# Patient Record
Sex: FEMALE | Race: WHITE
[De-identification: names, ages, dates, MRNs, and addresses within clinical notes are randomized per-mention and may not be internally consistent; named-entity substitution may affect disease eponyms.]

---

## 2018-04-24 ENCOUNTER — HOSPITAL ENCOUNTER (OUTPATIENT)
Dept: HOSPITAL 17 - HDIC | Age: 78
LOS: 1 days | Discharge: HOME | End: 2018-04-25
Attending: SURGERY
Payer: COMMERCIAL

## 2018-04-24 VITALS
TEMPERATURE: 98.2 F | OXYGEN SATURATION: 96 % | RESPIRATION RATE: 20 BRPM | DIASTOLIC BLOOD PRESSURE: 66 MMHG | SYSTOLIC BLOOD PRESSURE: 131 MMHG | HEART RATE: 102 BPM

## 2018-04-24 VITALS
RESPIRATION RATE: 18 BRPM | DIASTOLIC BLOOD PRESSURE: 101 MMHG | TEMPERATURE: 98.2 F | SYSTOLIC BLOOD PRESSURE: 229 MMHG | HEART RATE: 75 BPM | OXYGEN SATURATION: 93 %

## 2018-04-24 VITALS
RESPIRATION RATE: 18 BRPM | DIASTOLIC BLOOD PRESSURE: 101 MMHG | SYSTOLIC BLOOD PRESSURE: 229 MMHG | TEMPERATURE: 98.5 F | HEART RATE: 75 BPM | OXYGEN SATURATION: 93 %

## 2018-04-24 VITALS
SYSTOLIC BLOOD PRESSURE: 208 MMHG | TEMPERATURE: 99 F | RESPIRATION RATE: 20 BRPM | HEART RATE: 87 BPM | DIASTOLIC BLOOD PRESSURE: 81 MMHG | OXYGEN SATURATION: 96 %

## 2018-04-24 VITALS — BODY MASS INDEX: 21.9 KG/M2 | HEIGHT: 66 IN | WEIGHT: 136.25 LBS

## 2018-04-24 VITALS — HEART RATE: 96 BPM

## 2018-04-24 VITALS — HEART RATE: 112 BPM

## 2018-04-24 VITALS — HEART RATE: 78 BPM

## 2018-04-24 VITALS — DIASTOLIC BLOOD PRESSURE: 97 MMHG | HEART RATE: 106 BPM | SYSTOLIC BLOOD PRESSURE: 212 MMHG

## 2018-04-24 VITALS — HEART RATE: 72 BPM

## 2018-04-24 VITALS — HEART RATE: 74 BPM

## 2018-04-24 VITALS — HEART RATE: 76 BPM

## 2018-04-24 DIAGNOSIS — Z79.01: ICD-10-CM

## 2018-04-24 DIAGNOSIS — I10: ICD-10-CM

## 2018-04-24 DIAGNOSIS — I70.212: Primary | ICD-10-CM

## 2018-04-24 LAB
INR PPP: 1.1 RATIO
PROTHROMBIN TIME: 10.7 SEC (ref 9.8–11.6)

## 2018-04-24 PROCEDURE — G0269 OCCLUSIVE DEVICE IN VEIN ART: HCPCS

## 2018-04-24 PROCEDURE — 75625 CONTRAST EXAM ABDOMINL AORTA: CPT

## 2018-04-24 PROCEDURE — C1887 CATHETER, GUIDING: HCPCS

## 2018-04-24 PROCEDURE — 37224: CPT

## 2018-04-24 PROCEDURE — C1760 CLOSURE DEV, VASC: HCPCS

## 2018-04-24 PROCEDURE — 85730 THROMBOPLASTIN TIME PARTIAL: CPT

## 2018-04-24 PROCEDURE — 85610 PROTHROMBIN TIME: CPT

## 2018-04-24 PROCEDURE — 37228: CPT

## 2018-04-24 PROCEDURE — C2623 CATH, TRANSLUMIN, DRUG-COAT: HCPCS

## 2018-04-24 PROCEDURE — C1769 GUIDE WIRE: HCPCS

## 2018-04-24 PROCEDURE — C1893 INTRO/SHEATH, FIXED,NON-PEEL: HCPCS

## 2018-04-24 PROCEDURE — C1751 CATH, INF, PER/CENT/MIDLINE: HCPCS

## 2018-04-24 PROCEDURE — 75710 ARTERY X-RAYS ARM/LEG: CPT

## 2018-04-24 PROCEDURE — C1725 CATH, TRANSLUMIN NON-LASER: HCPCS

## 2018-04-24 PROCEDURE — 99153 MOD SED SAME PHYS/QHP EA: CPT

## 2018-04-24 PROCEDURE — 99152 MOD SED SAME PHYS/QHP 5/>YRS: CPT

## 2018-04-24 NOTE — PD.VS.PN
Pre-operative Note


Pre-operative diagnosis:


PAD, L LE claudication


Planned procedure:


Aortogram w/ L LE angiogram


Endovascular intervention


Interval History:


Pt has been feeling well ; no changes that would preclude OR.


Labs:


pending


Blood:


none needed


Imaging:


CTA reviewed


Orders:


NPO


Post-operative destination:


DOCU


Operative site marked:  Yes


Consent:


Informed consent has been obtained from Armani Pham. I have explained the 

procedure in detail and discussed the risks, benefits, and potential 

complications. All questions have been answered.











Chris Tatum MD Apr 24, 2018 09:22

## 2018-04-24 NOTE — HHI.PR
cc:   Chris Tatum MD


__________________________________________________





Immediate Post Op Note


Procedure Date:


Apr 24, 2018


Pre Op Diagnosis:  


L LE claudication, PAD


Post Op Diagnosis:  


Same


Surgeon:


Chris Tatum


Assistant(s):


none


Procedure:


1. Aortogram w/ L LE angiogram


2. L SFA PTA (5mm) with DCB


3. L peroneal PTA (3mm)


4. R CFA Angioseal


Findings:


SFA stent occlusion


Additional Information:


PTA of stent with embolism to peroneal, then PTA of peroneal


Complications:


embolism as above


Specimen(s) removed:


none


Estimated blood loss:


10mL


Anesthesia:  MAC


Drains:  None


Fluids:  500mL


IVF


Patient to:  Other (DOCU)


Patient Condition:  Good


Implant/Devices:  SEE IMPLANT LOG (if applicable)


Date/Time of Procedure:  SEE SURGICAL CARE RECORD











Chris Tatum MD Apr 24, 2018 12:45

## 2018-04-24 NOTE — CATHPROC
[employer] HIS Report

Study Information

Study Number    Admission           Scheduled Start              Study Start

 

[id number]    Apr 24 2018 8:27AM      04/24/2018 Apr 24 2018 11:26AM

 

Universal Service

 

Cath Endovascular Study

 

Admit Source               Facility Department

 

Other                   [facility] - Cath Lab

 

Physician and Clinical Staff

Initial MD Tatum, Chris          Circulator     Lizbeth Archer,RN

 

                         Recorder      Eliseo Serrano,RT(R)

 

                         Scrub        Trevor Ruby,RT(R)

 

Procedures Performed

Procedure                     Location (Site)           Vessel Name

 

Abdominal Angiogram                Abd Aorta (A3)           Aorta

PTA                        Peroneal (left)           Popliteal

PTA                        SFA (left)              Femoral Art

Wire insertion                   Fem Art (right)           Femoral Art

Equipment

Time         Description          Size     Mfg Part Number  Used/Scraped

                                       [id number]

11:32   [employer]NAM[employer]     OMNI FLUSH 65CM CATHETER    FR 4              Used

                                       *[id number]

                 INTRODUCER SET,

11:31   [employer]                       FR 5     [id number] *[id number] Used

                 MICROPUNCTURE STIFF

                 BALLOON, ADVANCE 18 LP .018 4        [id number]

12:28   COOK/ROMEO                      4X4               Used

                 X4                      *[id number]

                                       CXI-4.0--

11:51   COOK/ROMEO       CATHETER, FR4 CXI SUPPORT   FR 4             Used

                                       P-NS-0 *[id number]

                 SHEATH, FR6 AMARILIS 1 FLEXOR          [id number]

11:51   COOK[employer]ROMEO                       FR 6              Used

                 55CM                     *[id number]

                 WIRE, GUIDE APPROACH            FBP--25G

11:58   COOK/ROMEO                       300CM            Used

                 MICROWIRE                   *[id number]

                 WIRE, STORQ STANDARD MOD J          [id number]

11:53   CORDIS/ ROMEO                     300CM              Used

                 300CM                    *[id number]

                                       [id number]

12:33   DAIG/[employer] ANGIOSEAL, FR6 VIP       FR 6              Used

                                       *[id number]

                                       [id number]

12:27   [employer]IG/[employer] ANGIOSEAL, FR6 VIP       FR 6              Used

                                       *[id number]

                 BALLOON, ADMIRAL IN.PACT 5 X         [id number]

12:00   [employer]                130CM               Used

                 80 130CM                   *[id number]

                                       [id number]

11:31   [employer]   PACK, CCL CUSTOM        *                Used

                                       *[id number]

                                       [id number]

11:51   [employer]     WIRE, MENDEZ 260CM .035     260CM              Used

                                       *[id number]

                 TUBING, PRESSURE INJECTION          [id number]

11:31   NAMIC PACER                      72"               Used

                 72"                     *[id number]

11:31   [employer]        OMNIPAQUE, 300 MG, 150ML    150ML     [id number]      Used

 

11:31   [employer]        OMNIPAQUE, 300 MG, 50ML    50ML     [id number]      Used

                                       [id number]

11:31   [employer]     BLANKET,WARM AIR CCL      *                Used

                                       *[id number]

                                       [id number]

11:32   [employer]     SHEATH, FR4 TERUMO (10CM)   FR 4              Used

                                       *[id number]

                                       [id number]

11:55   [employer]     SHEATH, FR6 TERUMO (10CM)   FR 6              Used

                                       *[id number]

                 CATHETER, FR4 GUIDE ANGLED

11:47   [employer]/ROMEO                  FR 4      *[id number] Used

                 120CM

                 WIRE, ANGLED GLIDE .035           [id number]

11:31   [employer]/ROMEO                  260CM              Used

                 260CM                    *[id number]

 

Equipment Model, Serial, Lot Number and Expiration Data

Description           Model Number      Serial Number  Lot Number       Expiration Date

 

ANGIOSEAL, FR6 VIP                           [id number]        10-

 

ANGIOSEAL, FR6 VIP                           [id number]        10-

BALLOON, ADMIRAL IN.PACT 5 X

                                    [id number]       12-

80 130CM

CATHETER, FR4 CXI SUPPORT                        [id number]         03-

SHEATH, FR6 AMARILIS 1 FLEXOR

                                    [id number]         01-

55CM

WIRE, GUIDE APPROACH 

                                    [id number]         06-

MICROWIRE

Medication

Medication Total Dose (Bolus/Oral)

Medication             Total Dosage/Unit

 

1% XYLOCAINE               10 mL

 

FENTANYL                 100 mcg

 

HEPARIN                5000 units

 

VERSED                   2 mg

 

Medications (Bolus/Oral)

Medication          Time Given          Dosage/Unit      Administered By       Reason

 

VERSED            4/24/2018 11:40:29 AM      1 mg        Lizbeth Archer

1 mg VERSED given in lab by Lizbeth Archer, RN via Peripheral IV. Ordered by Chris Tatum.

 

1% XYLOCAINE         4/24/2018 11:41:12 AM     10 mL        Chris Tatum

10 mL 1% XYLOCAINE given in lab by Chris Tatum in Right Groin via Subcutaneous. Ordered by Chris Tatum.

 

FENTANYL           4/24/2018 11:41:57 AM     50 mcg        Lizbeth Archer

50 mcg FENTANYL given in lab by Lizbeth Archer, RN via Peripheral IV. Ordered by Chris Tatum.

 

HEPARIN           4/24/2018 11:51:04 AM    5000 units       Lizbeth Archer

5000 units HEPARIN given in lab by Lizbeth Archer, RN via Peripheral IV. Ordered by Chris Tatum.

 

VERSED            4/24/2018 12:04:06 PM      1 mg        Lizbeth Archer

1 mg VERSED given in lab by Lizbeth Archer, RN via Peripheral IV. Ordered by Chris Tatum.

 

FENTANYL           4/24/2018 12:05:45 PM     50 mcg        Lizbeth Archer

50 mcg FENTANYL given in lab by Lizbeth Archer, RN via Peripheral IV. Ordered by Chris Tatum.

 

Medication (Drip)

Medication          Time Given          Dosage/Unit      Concentration/Unit  Diluent (ml)  Solution

SODIUM BICARBONATE

               4/24/2018 11:30:04 AM     15 mL/hr         150       1000      NaCl .9

DRIP

Patient arrived on 15 mL/hr SODIUM BICARBONATE DRIP given by Chris Tatum in Left Antecubital via P
eripheral IV. Pump/Drip Flow = 100 ml/hr

using NaCl .9 with a concentration of 150 in 1000 ml. Ordered by Chris Tatum.

Initial Case Assessment

Cardiovascular

HR          Rhythm         NIBP

 

88          sr           193/101

 

Edema Present     Skin color           Skin

 

None         Normal             Warm Dry

 

Circulatory - Right Pulses

Femoral

 

3

 

Scale (0,1,2,3,4,d)

 

Circulatory - Left Pulses

Femoral

 

2

 

Scale (0,1,2,3,4,d)

 

Neurological State

           Oriented to time-place-

Alert                     Moves all extremities

           person

 

Respiration - General

Respiration Rate

           SpO2 (%)        O2 (lpm)

(B/min)

18          98           0

Final Case Assessment

Cardiovascular

HR            Rhythm         NIBP

 

89            sr           153/90

 

Edema Present       Skin color           Skin

 

None           Normal             Warm Dry

 

Circulatory - Right Pulses

Femoral

 

3

 

Scale (0,1,2,3,4,d)

 

Circulatory - Left Pulses

Femoral

 

2

 

Scale (0,1,2,3,4,d)

 

Neurological State

             Oriented to time-place-

Alert                       Moves all extremities

             person

 

Respiration - General

Respiration Rate

             SpO2 (%)        O2 (lpm)

(B/min)

18            96           0

 

Chronological Log

Time    Study Chronological Log

 

11:23:08  Patient arrived via Bed. No labs drawn this visit per Dr. Tatum

 

11:26:14  Patient Name, D.O.B, / Armband Verified By R.N.

 

11:26:16  Consent signed by the physician and the patient and verified by the Cath Lab staff.

 

11:26:16  Pre-op and post- op instructions given; patient acknowledges understanding of instructions.


 

11:26:43  MD arrived.

      Vitals capture started with the following parameters, Patient=Adult, Interval=5 min, Initial Pr
scdamh=530 mmHg,

11:27:38

      Deflation Rate=5 mmHg, Cuff placed on Right Ankle

11:28:54  HR=90 bpm, SEJP=372/101 mmhg, SpO2=97.0 %, Resp=21 B/min, Ceja=2

 

11:29:32  Reference ECG taken

 

11:29:45  A # 20 IV was noted in the Antecubital (left). Grade = 0

      Patient arrived on 15 mL/hr SODIUM BICARBONATE DRIP given by Chris Tatum in Left Antecubital
 via Peripheral IV.

11:30:04

      Pump/Drip Flow = 100 ml/hr using NaCl .9 with a concentration of 150 in 1000 ml. Ordered by Chris Tejada.

11:30:29  History and physical on the chart or being dictated.

      Assessment: Initial Case, HR=88 BPM, Rhythm=sr, LNUR=020/101 mmhg, Edema=None, Color=Normal, Sk
in = Warm,

      Dry

      Right Pulses: Femoral=3

11:30:31

      Left Pulses: Femoral=2

      Neurological: State=Alert, Ox3, LANDEROS

      Respiration: Resp=18 B/min, SpO2=98 %, O2=0 lpm

11:31:01  Bilateral groins prepped with 2% chlorhexidine, and draped after a 3 minute waiting time.

 

11:33:22  EB=312 bpm, RRHU=056/103 mmhg, SpO2=97.0 %, Resp=21 B/min, Ceja=2

11:38:23  HR=89 bpm, RXSZ=121/96 mmhg, SpO2=96.0 %, Resp=13 B/min, Ceja=2

      Time Out. Correct patient, correct procedure, correct physician, power injector loaded with con
trast with surgical team

11:40:08  present. Time Out Concurred by MD and individual staff in procedure. Loaded by Joana Archer Rn
. verified by Trevor Ruby.

11:40:29  1 mg VERSED given in lab by Lizbeth Archer, RN via Peripheral IV. Ordered by Deandre Tatum

 

11:41:00  Case Start

 

11:41:02  Verbal Stimulation=2 Physical Stimulation=2 Airway=2 Respiration=2 TOTAL=8. (0=absent, 1=li
mited, 2=present)

 

11:41:12  10 mL 1% XYLOCAINE given in lab by Chris Tatum in Right Groin via Subcutaneous. Ordered 
by Chris Tatum.

 

11:41:57  50 mcg FENTANYL given in lab by Lizbeth Archer, RN via Peripheral IV. Ordered by KRISHNA Tatum.

 

11:42:11  Access site was Right Femoral Artery.

 

11:42:20  A SHEATH, FR4 TERUMO (10CM) FR 4 was advanced into the Fem Art (right) using the Percutaneo
us technique.

 

11:43:22  HR=94 bpm, OIZB=490/98 mmhg, SpO2=94.0 %, Resp=17 B/min, Ceja=2

      A OMNI FLUSH 65CM CATHETER FR 4 was advanced over a wire. OMNIPAQUE, 300 MG, 150ML 150ML was us
ed for

11:43:40

      injections.

11:44:30  Through a OMNI FLUSH 65CM CATHETER FR 4, The Abdominal Aorta was injected with 10 cc's of c
ontrast.

 

11:45:43  A WIRE, ANGLED GLIDE .035 260CM 260CM was inserted via Fem Art (right).

      After removing the current catheter a CATHETER, FR4 GUIDE ANGLED 120CM FR 4 was advanced over a
 WIRE,

11:47:27

      ANGLED GLIDE .035 260CM 260CM.

11:48:23  HR=89 bpm, YNTG=297/85 mmhg, SpO2=95.0 %, Resp=17 B/min, Ceja=2

 

11:49:40  Injections down left leg through 4fr angled catheter.

 

11:51:04  5000 units HEPARIN given in lab by Lizbeth Archer, RN via Peripheral IV. Ordered by Chris Tatum.

 

11:51:17  A WIRE, MENDEZ 260CM .035 260CM was inserted via Fem Art (right).

 

11:51:23  Catheter was removed

 

11:53:20  HR=94 bpm, CGNW=083/90 mmhg, SpO2=93.0 %, Resp=18 B/min, Ceja=2

      A SHEATH, FR6 TERUMO (10CM) FR 6 was exchanged in the Fem Art (right). This was necessary in or
ronnie to

11:55:19

      accomodate a larger catheter.

      A SHEATH, FR6 AMARILIS 1 FLEXOR 55CM FR 6 was exchanged in the Fem Art (right). This was necessary
 in order to

11:55:27

      accomodate a larger catheter.

11:58:21  HR=89 bpm, PNRV=970/87 mmhg, SpO2=95.0 %, Resp=20 B/min, Ceja=2

      A CATHETER, FR4 CXI SUPPORT FR 4 was advanced over a wire. OMNIPAQUE, 300 MG, 50ML 50ML was use
d for

11:58:43

      injections.

11:58:49  The previous wire was exchanged for a WIRE, GUIDE APPROACH  MICROWIRE 300CM.

 

12:00:26  The previous wire was exchanged for a WIRE, STORQ STANDARD MOD J 300CM 300CM.

 

12:03:20  HR=93 bpm, KYBY=367/98 mmhg, SpO2=94.0 %, Resp=20 B/min, Ceja=2

      A BALLOON, ADMIRAL IN.PACT 5 X 80 130CM 130CM was inserted over WIRE, STORQ STANDARD MOD J 300C
M

12:03:20

      300CM via the SFA (left).

12:03:37  In the SFA (left) a BALLOON, ADMIRAL IN.PACT 5 X 80 130CM 130CM was inflated to 8 atms for 
180 seconds.

 

12:04:06  1 mg VERSED given in lab by Lizbeth Archer, RN via Peripheral IV. Ordered by Deandre Tatum

 

12:05:45  50 mcg FENTANYL given in lab by Lizbeth Archer, RN via Peripheral IV. Ordered by KRISHNA Tatum.

 

12:08:21  HR=84 bpm, LDML=061/89 mmhg, SpO2=94.0 %, Resp=12 B/min, Ceja=2

 

12:10:38  Balloon Removed.

 

12:13:24  HR=83 bpm, TUJG=444/76 mmhg, SpO2=94.0 %, Resp=17 B/min, Ceja=2

 

12:13:44  The previous wire was exchanged for a WIRE, GUIDE APPROACH  MICROWIRE 300CM.

 

12:18:23  HR=83 bpm, ACRL=277/71 mmhg, SpO2=94.0 %, Resp=15 B/min, Ceja=2

 

12:19:16  Catheter was removed w/o difficulty

       A balloons was inserted over WIRE, GUIDE APPROACH  MICROWIRE 300CM via the Peroneal (left).
 COOK 3mm *

12:19:18

       4cm .018 Balloon.

12:20:11   In the Peroneal (left) a balloons was inflated to 12 atms for 120 seconds.

 

12:23:19   In the Peroneal (left) a balloons was inflated to 12 atms for 120 seconds.

 

12:23:20   HR=79 bpm, CUGC=763/81 mmhg, SpO2=92.0 %, Resp=17 B/min, Ceja=2

 

12:26:26   Balloon Removed.

       A BALLOON, ADVANCE 18 LP .018 4 X 4 4 X 4 was inserted over WIRE, GUIDE APPROACH  MICROWIRE
 300CM via

12:27:03

       the Peroneal (left).

12:27:49   In the Peroneal (left) a BALLOON, ADVANCE 18 LP .018 4 X 4 4 X 4 was inflated to 8 atms fo
r 20 seconds.

 

12:29:00   HR=80 bpm, TFTF=776/79 mmhg, SpO2=95.0 %, Resp=17 B/min, Ceja=2

 

12:31:11   Balloon Removed.

 

12:32:21   The previous wire was exchanged for a WIRE, STORQ STANDARD MOD J 300CM 300CM.

 

12:33:23   HR=90 bpm, NQPF=934/90 mmhg, SpO2=95.0 %, Resp=23 B/min, Ceja=2

 

12:34:10   ANGIOSEAL, FR6 VIP FR 6 placement in the Fem Art (right)

       Assessment: Final Case, HR=89 BPM, Rhythm=sr, LHJF=693/90 mmhg, Edema=None, Color=Normal, Skin
 = Warm,

       Dry

       Right Pulses: Femoral=3

12:35:01

       Left Pulses: Femoral=2

       Neurological: State=Alert, Ox3, LANDEROS

       Respiration: Resp=18 B/min, SpO2=96 %, O2=0 lpm

12:35:34   Catheter(s) removed without difficulty

 

12:35:37   Case End

 

12:35:38   No case complications noted.

 

12:35:40   Cine recording checked.

 

12:37:01   Patient moved to stretcher

 

12:38:22   HR=85 bpm, IWSF=628/80 mmhg, SpO2=95.0 %, Resp=19 B/min, Ceja=2

 

 

 

 

End Study - Contrast Media Used In Study

Contrast        Total Opened (mL)    Total Used (mL)       Total Wasted (mL)

 

Omnipaque       45           45              0

 

End Study - Radiation Exposure

Fluoro Time

(minutes)

13.8

 

 

End Study - Patient Disposition

Complications     Transferred To

 

No           Telemetry Bed

## 2018-04-25 VITALS
SYSTOLIC BLOOD PRESSURE: 154 MMHG | DIASTOLIC BLOOD PRESSURE: 68 MMHG | TEMPERATURE: 98.7 F | OXYGEN SATURATION: 96 % | RESPIRATION RATE: 16 BRPM | HEART RATE: 100 BPM

## 2018-04-25 VITALS
HEART RATE: 100 BPM | OXYGEN SATURATION: 96 % | SYSTOLIC BLOOD PRESSURE: 154 MMHG | RESPIRATION RATE: 16 BRPM | TEMPERATURE: 98.7 F | DIASTOLIC BLOOD PRESSURE: 68 MMHG

## 2018-04-25 VITALS — HEART RATE: 109 BPM

## 2018-04-25 VITALS — HEART RATE: 98 BPM

## 2018-04-25 VITALS — HEART RATE: 101 BPM | SYSTOLIC BLOOD PRESSURE: 182 MMHG | DIASTOLIC BLOOD PRESSURE: 80 MMHG

## 2018-04-25 VITALS — HEART RATE: 76 BPM

## 2018-04-25 VITALS — HEART RATE: 87 BPM

## 2018-04-25 VITALS — HEART RATE: 106 BPM

## 2018-04-25 VITALS — HEART RATE: 86 BPM

## 2018-04-25 VITALS — HEART RATE: 100 BPM

## 2018-04-25 NOTE — PD.VS.PN
Subjective


POD #:  1


Procedure(s):  


Aortogram w/ L LE angiogram


L SFA PTA (5mm) with DCB


L peroneal PTA (3mm)


R CFA Angioseal


Subjective/Hospital Course


78/F with a PMH of LEFT lower extremity claudication


Pt S/P Left lower extremity revascularization 


Pt w/o complaints


LE warm w/ motor intact





Objective


Vitals/I&O











  Date Time  Temp Pulse Resp B/P (MAP) Pulse Ox O2 Delivery O2 Flow Rate FiO2


 


4/25/18 08:00 98.7 100 16 154/68 (96) 96   


 


4/25/18 08:00  100      


 


4/25/18 07:00  100      


 


4/25/18 06:13  109      


 


4/25/18 05:00    182/80 (114)    


 


4/25/18 05:00  101      


 


4/25/18 04:00  98      


 


4/25/18 03:10  106      


 


4/25/18 03:09 98.7 100 16 154/68 (96) 96   


 


4/25/18 02:00  87      


 


4/25/18 01:00  86      


 


4/25/18 00:00  76      


 


4/24/18 23:00  100      


 


4/24/18 23:00 98.2 102 20 131/66 (87) 96   


 


4/24/18 22:00  96      


 


4/24/18 21:00  110  212/97 (135)    


 


4/24/18 21:00  106      


 


4/24/18 20:00  112      


 


4/24/18 19:00  87      


 


4/24/18 19:00 99.0 87 20 208/81 (123) 96   


 


4/24/18 18:00  76      


 


4/24/18 17:00  78      


 


4/24/18 16:00  74      


 


4/24/18 15:17 98.2 75 18 229/101 (143) 93   


 


4/24/18 15:00  72      


 


4/24/18 12:52     95 Room Air  


 


4/24/18 09:00 98.5 75 18 229/101 (143) 93   














 4/25/18 4/25/18 4/25/18





 07:00 15:00 23:00


 


Intake Total 380 ml  


 


Output Total 600 ml  


 


Balance -220 ml  








Exam:


GENERAL: A&OX3,NAD,GCS15


SKIN: 


LE warm with motor intact 


MUSCULOSKELETAL: No cyanosis, or edema. 








Laboratory





Laboratory Tests








Test


  4/24/18


13:40 4/24/18


20:56 4/25/18


05:27


 


Prothrombin Time 10.7   


 


Prothromb Time International


Ratio 1.1 


  


  


 


 


Activated Partial


Thromboplast Time 41.1 


  39.7 


  60.9 


 











Assessment and Plan


Assessment:  


(1) Claudication in peripheral vascular disease


(2) PAD (peripheral artery disease)


Plan


78/F s/p L LE revascularization 


Pt w/o complaints 


B LE warm w/ motor intact 





Plan


Pt clear for D/C w/ continued anticoagulation therapy (Lovenox) 


Arranged out pt f/u in 1W with a surveillance JEYSON 





Allegra Lizarraga NP


[facility]


[phone]


Discharge Planning


Today











Allegra Lizarraga [facility] Apr 25, 2018 09:01

## 2018-04-25 NOTE — MP
cc:

Chris Tatum MD

****

 

 

DATE OF OPERATION:

04/24/2018

 

PREOPERATIVE DIAGNOSIS:

Left lower extremity claudication, peripheral vascular disease.

 

POSTOPERATIVE DIAGNOSIS:

Left lower extremity claudication, peripheral vascular disease.

 

PROCEDURE PERFORMED:

1.  Aortogram with left lower extremity angiogram.

2.  Left SFA angioplasty.

3.  Left peroneal angioplasty.

4.  Right common femoral artery Angio-Seal.

 

ATTENDING SURGEON:

Chris Tatum MD.

 

ANESTHESIA:

Local with sedation.

 

INDICATIONS FOR PROCEDURE:

Ms. Pham is a 78-year-old lady with left lower extremity 

claudication that is lifestyle limiting.  She had previous 

endovascular therapies and the stents were noted to be occluded.  She 

is taken to the operating room for angiographic evaluation and 

treatment.  There is no prior catheterization based imaging available 

for my review.

 

DESCRIPTION OF PROCEDURE:

Informed consent was obtained from the patient.  She was taken to the 

operating room and placed supine on the operating table and an 

appropriate time-out was taken to ensure the patient's identity, 

operative site and planned procedure.  The administration of 

antibiotics was not necessary as this is a clean procedure without 

planned implantation of any foreign object.  Everyone in the room 

agreed with the time-out and we proceeded.  Her bilateral groins were 

prepped and draped and the right groin was anesthetized with 1% 

lidocaine.  A 21-gauge micropuncture needle was used to access the 

right limb of her aortobifemoral.  This was exchanged using Seldinger 

technique for the micropuncture sheath, through which a 0.035 

Glidewire was introduced.  The micropuncture sheath was exchanged for 

a 4-Thai sheath and a VCF catheter was placed over the wire and 

through the sheath.  An aortogram and pelvic arteriogram was obtained.

 The Glidewire was reintroduced and navigated down to the left common 

femoral artery and the VCF catheter exchanged for a Kumpe catheter and

this was advanced down the left common femoral artery, and a left 

lower extremity angiogram was obtained.  The patient was systemically 

heparinized with 5000 units of IV heparin.  A 0.035 STORQ wire was 

advanced down the mid SFA.  The Kumpe catheter and 4-Thai sheath 

removed and a 6-Thai 55 cm ____ was introduced.  A CXI catheter was 

placed over the STORQ and the STORQ exchanged for a  and the  

was used to navigate through the occluded stent.  The  and CXI wire

and catheter respectively were navigated through the occluded stent 

and the stent was angioplastied with a 5 mm drug-coated balloon for 3 

minutes.  Completion angiogram showed a persistent eccentric mass at 

the stent suggestive of thrombus and the distal angiogram showed the 

patient had a peroneal artery thrombus.  The  wire and CXI catheter

was then advanced down to the peroneal artery and an angioplasty of 

the peroneal artery was performed with a 3 and 4 mm balloon and the 

completion angiogram showed significant improvement in the embolic 

complication with runoff down to the distal peroneal artery.  The 

wire, catheter and sheath were removed.  The Infante was reintroduced 

through the sheath and the sheath was then removed with a Infante and 

the groin was closed with an Angio-Seal.  At the conclusion of the 

case, the patient had a warm foot with a nice Doppler signal in the 

foot and with motor intact.  She will be admitted postoperatively for 

a heparin drip.  She had no other complications.

 

INTERPRETATION OF IMAGES:

The patient's aortobifemoral is end-to-side proximally with a patent 

native right common iliac artery and an in-limb stent on the left.  

The common femoral artery and superficial femoral artery are patent.  

The profunda is patent.  There are 2  SFA stents on the 

right.  The second one is occluded and there was peroneal artery 

runoff to the foot.  After angioplasty of the distal SFA stent, there 

is a residual eccentric mass and there is a filling defect in the 

peroneal artery that was treated with an angioplasty.

 

 

 

 

 

 

__________________________________

MD BOZENA Herrera/GLEN/

D: 04/24/2018, 12:52 PM

T: 04/24/2018, 01:23 PM

Visit #: [id number]

Job #: [id number]

## 2018-04-25 NOTE — PD.VS.DC
__________________________________________________





Discharge Summary


Admission Date:


04/24/18


Discharge Date:  Apr 25, 2018


Admission Diagnosis:  


(1) PAD (peripheral artery disease)


(2) Claudication in peripheral vascular disease


Discharge Diagnosis:  


(1) Claudication in peripheral vascular disease


ICD Codes:  I73.9 - Peripheral vascular disease, unspecified


(2) PAD (peripheral artery disease)


ICD Codes:  I73.9 - Peripheral vascular disease, unspecified


Brief History from admission


78/F with a hx of L LE PAD with claudication


Procedure(s):  


Aortogram w/ L LE angiogram


L SFA PTA (5mm) with DCB


L peroneal PTA (3mm)


R CFA Angioseal


Significant Findings


GENERAL: A&OX3,NAD,GCS15


SKIN: 


LE warm with motor intact 


MUSCULOSKELETAL: No cyanosis, or edema. 








Laboratory Tests








Test


  4/24/18


13:40 4/24/18


20:56 4/25/18


05:27


 


Activated Partial


Thromboplast Time 41.1 SEC


(24.3-30.1) 39.7 SEC


(24.3-30.1) 60.9 SEC


(24.3-30.1)








Hospital Course:


78/F with a hx of L LE PAD with claudication 


Pt s/p Aortogram w/ L LE angiogram/L SFA PTA (5mm) with DCB/L peroneal PTA (3mm)

/R CFA Angioseal


Post procedure pt c/o left leg feeling cold/pt was motor intact and was 

admitted for observation and anticoagulation therapy





POD 1


Pt S/P Left lower extremity revascularization 


Pt w/o complaints


LE warm w/ motor intact


Pt clear for d/c with continued anticoagulation therapy (Lovenox) 


Arranged out pt f/u in 1W with a surveillance JEYSON








Allergies








Coded Allergies Type Severity Reaction Last Updated Verified


 


  No Known Allergies    10/1/12 Yes














 4/23/18 4/23/18 4/24/18 4/24/18 4/25/18 4/25/18





 06:00 18:00 06:00 18:00 06:00 18:00


 


Intake Total    425 ml 380 ml 


 


Output Total    400 ml 600 ml 


 


Balance    25 ml -220 ml 


 


      


 


Intake Oral    425 ml 240 ml 


 


IV Total     140 ml 


 


Output Urine Total    400 ml 600 ml 


 


# Bowel Movements    0  








Laboratory Tests








Test


  4/24/18


13:40 4/24/18


20:56 4/25/18


05:27


 


Prothrombin Time 10.7 SEC   


 


Prothromb Time International


Ratio 1.1 RATIO 


  


  


 


 


Activated Partial


Thromboplast Time 41.1 SEC 


  39.7 SEC 


  60.9 SEC 


 








Orders








Procedure Category Date Status





  Time 


 


Sodium Bicarbonate MED 4/24/18 In Process





8.4% Inj (Sodium Bica  09:30 


 


Hydralazine Inj MED 4/24/18 Complete





 (Apresoline Inj)  10:00 


 


Endovascular Cath CATH 4/24/18 Logged





   


 


Heparin-Ns/Pf Flush MED 4/24/18 Complete





Bag (Heparin-Ns/Pf F  11:30 


 


Midazolam Inj (Versed MED 4/24/18 Complete





Inj)  11:30 


 


Fentanyl Inj MED 4/24/18 Complete





 (Fentanyl Inj)  11:30 


 


Lidocaine Pf 1% Inj MED 4/24/18 Complete





 (Xylocaine-Mpf 1% In  11:30 


 


Heparin Inj (Heparin MED 4/24/18 Complete





Inj)  11:30 


 


Nitroglycerin Inj MED 4/24/18 Complete





 (Nitroglycerin Inj)  11:30 


 


Place In Observation ADMITTING 4/24/18 Transmitted





   


 


Code Status CODE 4/24/18 Transmitted





  12:45 


 


Vital Signs (Adult) BRONWYN 4/24/18 Complete





  12:45 


 


Cardiac Monitor / BRONWYN 4/24/18 In Process





Telemetry  12:45 


 


Activity Oob Ad Nimo BRONWYN 4/24/18 In Process





  18:00 


 


Activity Bed Rest BRONWYN 4/24/18 In Process





  12:45 


 


Precautions BRONWYN 4/24/18 In Process





  12:45 


 


Diet Heart Healthy DIET 4/24/18 Transmitted





  Lunch 


 


Clopidogrel (Plavix) MED 4/26/18 In Process





  09:00 


 


Famotidine (Pepcid) MED 4/24/18 In Process





  21:00 


 


Docusate Sodium-Senna MED 4/24/18 In Process





 (Nicole-Colace)  21:00 


 


Magnesium Hydroxide MED 4/24/18 In Process





Liq (Milk Of Magnesi  12:45 


 


Sennosides (Senokot) MED 4/24/18 In Process





  12:45 


 


Bisacodyl Supp MED 4/24/18 In Process





 (Dulcolax Supp)  12:45 


 


Lactulose Liq MED 4/24/18 In Process





 (Lactulose Liq)  12:45 


 


Heparin Inj (Heparin MED 4/24/18 In Process





Inj)  18:45 


 


Heparin Inj (Heparin MED 4/24/18 In Process





Inj)  18:45 


 


Heparin-D5w 25,000 MED 4/24/18 In Process





U/250 Ml (Heparin-D5w  12:45 


 


Act Partial Throm LAB 4/24/18 Complete





Time (Ptt)  12:45 


 


Prothrombin Time / LAB 4/24/18 Complete





Inr (Pt)  12:45 


 


Cbc No Diff, Includes LAB 4/27/18 Verified





Plts  06:00 


 


Act Partial Throm LAB 4/24/18 Complete





Time (Ptt)  19:45 


 


Occult Blood EDIS 4/24/18 In Process





 (Hemoccult) Stool  12:45 


 


^ Other Nursing Orders BRONWYN 4/24/18 In Process





  13:26 


 


Iohexol 350 Inj (Cath MED 4/24/18 Complete





Lab) (Omnipaque 35  08:28 


 


Hydralazine Inj MED 4/24/18 In Process





 (Apresoline Inj)  20:30 


 


Ondansetron Inj MED 4/24/18 In Process





 (Zofran Inj)  21:45 


 


Act Partial Throm LAB 4/25/18 Complete





Time (Ptt)  05:30 


 


Act Partial Throm LAB 4/25/18 In Process





Time (Ptt)  12:00 


 


Attending Discharge DISCHARGE 4/25/18 Transmitted





Order   








Vital Signs








  Date Time  Temp Pulse Resp B/P (MAP) Pulse Ox O2 Delivery O2 Flow Rate FiO2


 


4/25/18 08:00 98.7 100 16 154/68 (96) 96   


 


4/25/18 08:00  100      


 


4/25/18 07:00  100      


 


4/25/18 06:13  109      


 


4/25/18 05:00    182/80 (114)    


 


4/25/18 05:00  101      


 


4/25/18 04:00  98      


 


4/25/18 03:10  106      


 


4/25/18 03:09 98.7 100 16 154/68 (96) 96   


 


4/25/18 02:00  87      


 


4/25/18 01:00  86      


 


4/25/18 00:00  76      


 


4/24/18 23:00  100      


 


4/24/18 23:00 98.2 102 20 131/66 (87) 96   


 


4/24/18 22:00  96      


 


4/24/18 21:00  110  212/97 (135)    


 


4/24/18 21:00  106      


 


4/24/18 20:00  112      


 


4/24/18 19:00  87      


 


4/24/18 19:00 99.0 87 20 208/81 (123) 96   


 


4/24/18 18:00  76      


 


4/24/18 17:00  78      


 


4/24/18 16:00  74      


 


4/24/18 15:17 98.2 75 18 229/101 (143) 93   


 


4/24/18 15:00  72      


 


4/24/18 12:52     95 Room Air  


 


4/24/18 09:00 98.5 75 18 229/101 (143) 93   








Discharge Condition:  Good


Discharge Disposition:  Discharge Home


Discharge Instructions:


DIET


You may resume your regular diet


ACTIVITY


Activity as tolerated


You may shower


NO tub baths for 5 days


No swimming for 5 days 


MEDICATION


You may resume your home medication


You were prescribed an additional medication for anticoagulation therapy


This medication is given as an injection in the subcutaneous tissue (twice 

daily for 2W)


It is recommended to rotate injection sites





PLEASE call the office with any questions or concerns


Any questions or concerns: Call [facility] at [facility]  [phone]











Allegra Lizarraga Apr 25, 2018 09:22

## 2018-05-10 ENCOUNTER — HOSPITAL ENCOUNTER (OUTPATIENT)
Dept: HOSPITAL 17 - CPRE | Age: 78
End: 2018-05-10
Attending: SURGERY
Payer: COMMERCIAL

## 2018-05-10 DIAGNOSIS — I73.9: ICD-10-CM

## 2018-05-10 DIAGNOSIS — Z79.01: ICD-10-CM

## 2018-05-10 DIAGNOSIS — Z01.812: ICD-10-CM

## 2018-05-10 DIAGNOSIS — Z01.818: ICD-10-CM

## 2018-05-10 DIAGNOSIS — Z01.810: Primary | ICD-10-CM

## 2018-05-10 LAB
BASOPHILS # BLD AUTO: 0.1 TH/MM3 (ref 0–0.2)
BASOPHILS NFR BLD: 1.1 % (ref 0–2)
BUN SERPL-MCNC: 8 MG/DL (ref 7–18)
CALCIUM SERPL-MCNC: 8.7 MG/DL (ref 8.5–10.1)
CHLORIDE SERPL-SCNC: 104 MEQ/L (ref 98–107)
CREAT SERPL-MCNC: 0.86 MG/DL (ref 0.5–1)
EOSINOPHIL # BLD: 0.1 TH/MM3 (ref 0–0.4)
EOSINOPHIL NFR BLD: 2.3 % (ref 0–4)
ERYTHROCYTE [DISTWIDTH] IN BLOOD BY AUTOMATED COUNT: 14.2 % (ref 11.6–17.2)
GFR SERPLBLD BASED ON 1.73 SQ M-ARVRAT: 64 ML/MIN (ref 89–?)
HCO3 BLD-SCNC: 29.3 MEQ/L (ref 21–32)
HCT VFR BLD CALC: 42.3 % (ref 35–46)
HGB BLD-MCNC: 14.3 GM/DL (ref 11.6–15.3)
INR PPP: 1 RATIO
LYMPHOCYTES # BLD AUTO: 1.4 TH/MM3 (ref 1–4.8)
LYMPHOCYTES NFR BLD AUTO: 24.4 % (ref 9–44)
MCH RBC QN AUTO: 30.9 PG (ref 27–34)
MCHC RBC AUTO-ENTMCNC: 33.9 % (ref 32–36)
MCV RBC AUTO: 91.3 FL (ref 80–100)
MONOCYTE #: 0.3 TH/MM3 (ref 0–0.9)
MONOCYTES NFR BLD: 4.6 % (ref 0–8)
NEUTROPHILS # BLD AUTO: 4 TH/MM3 (ref 1.8–7.7)
NEUTROPHILS NFR BLD AUTO: 67.6 % (ref 16–70)
PLATELET # BLD: 211 TH/MM3 (ref 150–450)
PMV BLD AUTO: 7.7 FL (ref 7–11)
PROTHROMBIN TIME: 10.1 SEC (ref 9.8–11.6)
RBC # BLD AUTO: 4.63 MIL/MM3 (ref 4–5.3)
SODIUM SERPL-SCNC: 141 MEQ/L (ref 136–145)
WBC # BLD AUTO: 5.9 TH/MM3 (ref 4–11)

## 2018-05-10 PROCEDURE — 85730 THROMBOPLASTIN TIME PARTIAL: CPT

## 2018-05-10 PROCEDURE — 85025 COMPLETE CBC W/AUTO DIFF WBC: CPT

## 2018-05-10 PROCEDURE — 93005 ELECTROCARDIOGRAM TRACING: CPT

## 2018-05-10 PROCEDURE — 80048 BASIC METABOLIC PNL TOTAL CA: CPT

## 2018-05-10 PROCEDURE — 71046 X-RAY EXAM CHEST 2 VIEWS: CPT

## 2018-05-10 PROCEDURE — 36415 COLL VENOUS BLD VENIPUNCTURE: CPT

## 2018-05-10 PROCEDURE — 85610 PROTHROMBIN TIME: CPT

## 2018-05-10 NOTE — EKG
Date Performed: 05/10/2018       Time Performed: 11:28:24

 

PTAGE:      78 years

 

EKG:      Sinus rhythm 

 

 Since previous tracing, no significant change noted NORMAL ECG

 

PREVIOUS TRACING       :   02/06/2014   08.18.34

 

DOCTOR:   Nate Amaral  Interpretating Date/Time  05/10/2018 20:26:26

## 2018-05-10 NOTE — RADRPT
EXAM DATE/TIME:  05/10/2018 12:05 

 

[facility] COMPARISON:     

No previous studies available for comparison.

 

                     

INDICATIONS :     

Evaluate for pneumothorax, pneumonia or communicable disease. Preop chest for lower extremity vascula
r surgery on 5/14/18, no chest complaints at this time

                     

 

MEDICAL HISTORY :     

None.          

 

SURGICAL HISTORY :     

None.   

 

ENCOUNTER:     

Initial                                        

 

ACUITY:     

1 day      

 

PAIN SCORE:     

0/10

 

LOCATION:     

Bilateral chest 

 

FINDINGS:     

PA and lateral views of the chest demonstrate the lungs to be symmetrically aerated without evidence 
of mass, infiltrate or effusion.  The cardiomediastinal contours are unremarkable.  Osseous structure
s are intact. Clips are seen in the right upper quadrant of the abdomen.

 

CONCLUSION:     No acute disease.  

 

 

 

 Ron Cohn MD on May 10, 2018 at 12:47           

Board Certified Radiologist.

 This report was verified electronically.

## 2018-05-14 ENCOUNTER — HOSPITAL ENCOUNTER (INPATIENT)
Dept: HOSPITAL 17 - HSDI | Age: 78
LOS: 3 days | Discharge: HOME HEALTH | DRG: 254 | End: 2018-05-17
Attending: SURGERY | Admitting: SURGERY
Payer: COMMERCIAL

## 2018-05-14 VITALS — WEIGHT: 139.99 LBS | HEIGHT: 66 IN | BODY MASS INDEX: 22.5 KG/M2

## 2018-05-14 DIAGNOSIS — I70.222: Primary | ICD-10-CM

## 2018-05-14 DIAGNOSIS — I10: ICD-10-CM

## 2018-05-14 DIAGNOSIS — K21.9: ICD-10-CM

## 2018-05-14 DIAGNOSIS — Z87.891: ICD-10-CM

## 2018-05-14 DIAGNOSIS — I25.10: ICD-10-CM

## 2018-05-14 DIAGNOSIS — Z86.79: ICD-10-CM

## 2018-05-14 DIAGNOSIS — E11.51: ICD-10-CM

## 2018-05-14 LAB
ERYTHROCYTE [DISTWIDTH] IN BLOOD BY AUTOMATED COUNT: 14 % (ref 11.6–17.2)
HCT VFR BLD CALC: 36.7 % (ref 35–46)
HGB BLD-MCNC: 12.6 GM/DL (ref 11.6–15.3)
INR PPP: 1.1 RATIO
MCH RBC QN AUTO: 31.3 PG (ref 27–34)
MCHC RBC AUTO-ENTMCNC: 34.3 % (ref 32–36)
MCV RBC AUTO: 91.4 FL (ref 80–100)
PLATELET # BLD: 179 TH/MM3 (ref 150–450)
PMV BLD AUTO: 7.4 FL (ref 7–11)
PROTHROMBIN TIME: 10.7 SEC (ref 9.8–11.6)
RBC # BLD AUTO: 4.01 MIL/MM3 (ref 4–5.3)
WBC # BLD AUTO: 8.1 TH/MM3 (ref 4–11)

## 2018-05-14 PROCEDURE — 85730 THROMBOPLASTIN TIME PARTIAL: CPT

## 2018-05-14 PROCEDURE — 04UL0JZ SUPPLEMENT LEFT FEMORAL ARTERY WITH SYNTHETIC SUBSTITUTE, OPEN APPROACH: ICD-10-PCS | Performed by: SURGERY

## 2018-05-14 PROCEDURE — 04CL0ZZ EXTIRPATION OF MATTER FROM LEFT FEMORAL ARTERY, OPEN APPROACH: ICD-10-PCS | Performed by: SURGERY

## 2018-05-14 PROCEDURE — 041L0JN BYPASS LEFT FEMORAL ARTERY TO POSTERIOR TIBIAL ARTERY WITH SYNTHETIC SUBSTITUTE, OPEN APPROACH: ICD-10-PCS | Performed by: SURGERY

## 2018-05-14 PROCEDURE — 87641 MR-STAPH DNA AMP PROBE: CPT

## 2018-05-14 PROCEDURE — B41G1ZZ FLUOROSCOPY OF LEFT LOWER EXTREMITY ARTERIES USING LOW OSMOLAR CONTRAST: ICD-10-PCS | Performed by: SURGERY

## 2018-05-14 PROCEDURE — 75710 ARTERY X-RAYS ARM/LEG: CPT

## 2018-05-14 PROCEDURE — 85027 COMPLETE CBC AUTOMATED: CPT

## 2018-05-14 PROCEDURE — 80048 BASIC METABOLIC PNL TOTAL CA: CPT

## 2018-05-14 PROCEDURE — 86850 RBC ANTIBODY SCREEN: CPT

## 2018-05-14 PROCEDURE — 86900 BLOOD TYPING SEROLOGIC ABO: CPT

## 2018-05-14 PROCEDURE — 86901 BLOOD TYPING SEROLOGIC RH(D): CPT

## 2018-05-14 PROCEDURE — 85610 PROTHROMBIN TIME: CPT

## 2018-05-14 RX ADMIN — GABAPENTIN SCH MG: 100 CAPSULE ORAL at 22:32

## 2018-05-14 RX ADMIN — STANDARDIZED SENNA CONCENTRATE AND DOCUSATE SODIUM SCH TAB: 8.6; 5 TABLET, FILM COATED ORAL at 22:32

## 2018-05-14 RX ADMIN — ATORVASTATIN CALCIUM SCH MG: 40 TABLET, FILM COATED ORAL at 22:31

## 2018-05-14 RX ADMIN — FAMOTIDINE SCH MG: 20 TABLET, FILM COATED ORAL at 22:32

## 2018-05-14 NOTE — HHI.HP
History of Present Illness


Chief Complaint:


L LE rest pain


History of Present Illness


77 yo female with PAD and L LE rest pain, ABIs 0.4.  No tissue loss and no 

motor dysfunction.





Past/Family/Social History


Past Medical History


PAD


HTN


CAD


DM


GERD


Past Surgical History


ABF


R LE bypass


Multiple L LE interventions (endo)


Social History


former smoker


Family History


NC


Home Medications


Reported Medications


Diphenhydramine-Acetaminophen (Acetaminophen-Diphenhydramine) 500 Mg-25 Mg Tab, 

2 TAB PO HS


   5/10/18


Gabapentin (Gabapentin) 100 Mg Cap, 200 MG PO HS, #30 CAP 0 Refills


   5/10/18


Clopidogrel (Plavix) 75 Mg Tab, 75 MG PO DAILY for Blood Clot Prevention, #30 

TAB 0 Refills


   18


Discontinued Scripts


Enoxaparin Inj (Lovenox Inj) 60 Mg/0.6 Ml Syr, 60 MG SQ BID for PAD for 14 Days

, #28 SYRINGE 0 Refills


   Prov:ZiaAllegra F ARNP         18


Coded Allergies:  


     ACE Inhibitors (Verified  Adverse Reaction, Severe, Cough, 5/10/18)





Review of Systems


Constitutional:  DENIES: Diaphoretic episodes, Fatigue, Fever, Weight gain, 

Weight loss, Chills, Dizziness, Change in appetite, Night Sweats


Cardiovascular:  DENIES: Chest pain, Palpitations, Syncope, Dyspnea on Exertion

, PND, Lower Extremity Edema, Orthopnea, Claudication





Physical Exam


Neuro:


alert, oriented, no distress


HEENT:


NC/AT


Neck:


no JVD


Heart:


reg rate, no M


Lungs:


clear bilaterally


Abdomen:


soft, NT


Vascular:


palpable L femoral pulse, normal


no popliteal or pedal pulses


Extremities:


no wounds


L groin incision healed


pending


will do angiogram in OR as part of distal bypass





Caprini VTE Risk Assessment


Caprini VTE Risk Assessment:  No/Low Risk (score <= 1)


Caprini Risk Assessment Model











 Point Value = 1          Point Value = 2  Point Value = 3  Point Value = 5


 


Age 41-60


Minor surgery


BMI > 25 kg/m2


Swollen legs


Varicose veins


Pregnancy or postpartum


History of unexplained or recurrent


   spontaneous 


Oral contraceptives or hormone


   replacement


Sepsis (< 1 month)


Serious lung disease, including


   pneumonia (< 1 month)


Abnormal pulmonary function


Acute myocardial infarction


Congestive heart failure (< 1 month)


History of inflammatory bowel disease


Medical patient at bed rest Age 61-74


Arthroscopic surgery


Major open surgery (> 45 min)


Laparoscopic surgery (> 45 min)


Malignancy


Confined to bed (> 72 hours)


Immobilizing plaster cast


Central venous access Age >= 75


History of VTE


Family history of VTE


Factor V Leiden


Prothrombin 02311U


Lupus anticoagulant


Anticardiolipin antibodies


Elevated serum homocysteine


Heparin-induced thrombocytopenia


Other congenital or acquired


   thrombophilia Stroke (< 1 month)


Elective arthroplasty


Hip, pelvis, or leg fracture


Acute spinal cord injury (< 1 month)








Prophylaxis Regimen











   Total Risk


Factor Score Risk Level Prophylaxis Regimen


 


0-1      Low Early ambulation


 


2 Moderate Order ONE of the following:


*Sequential Compression Device (SCD)


*Heparin 5000 units SQ BID


 


3-4 Higher Order ONE of the following medications:


*Heparin 5000 units SQ TID


*Enoxaparin/Lovenox 40 mg SQ daily (WT < 150 kg, CrCl > 30 mL/min)


*Enoxaparin/Lovenox 30 mg SQ daily (WT < 150 kg, CrCl > 10-29 mL/min)


*Enoxaparin/Lovenox 30 mg SQ BID (WT < 150 kg, CrCl > 30 mL/min)


AND/OR


*Sequential Compression Device (SCD)


 


5 or more Highest Order ONE of the following medications:


*Heparin 5000 units SQ TID (Preferred with Epidurals)


*Enoxaparin/Lovenox 40 mg SQ daily (WT < 150 kg, CrCl > 30 mL/min)


*Enoxaparin/Lovenox 30 mg SQ daily (WT < 150 kg, CrCl > 10-29 mL/min)


*Enoxaparin/Lovenox 30 mg SQ BID (WT < 150 kg, CrCl > 30 mL/min)


AND


*Sequential Compression Device (SCD)











Assessment and Plan


Plan


L LE revascularization today





post-op admit


Discharge Planning


likely 3-4 days











Chris Tatum MD May 14, 2018 08:58

## 2018-05-14 NOTE — HHI.PR
cc:   Chris Tatum MD


__________________________________________________





Immediate Post Op Note


Procedure Date:


May 14, 2018


Pre Op Diagnosis:  


PAD, failed endovascular interventions


Post Op Diagnosis:  


PAD, failed endovascular interventions


Surgeon:


Chris Tatum


Assistant(s):


Chris Wilson


Procedure:


L PFA patch endarterectomy


L fem-PT with cryo


L LE angiogram


Findings:


very small tibial arteries


proximal profunda stenosis


Additional Information:


pt wendy well - strong PT at end of case


Complications:


none


Specimen(s) removed:


none for pathology


Estimated blood loss:


200mL


Anesthesia:  General


Drains:  None


Fluids:  1800mL


IVF


Urinary Output (mLs):  700


Patient to:  PACU


Patient Condition:  Good


Implant/Devices:  SEE IMPLANT LOG (if applicable)


Date/Time of Procedure:  SEE SURGICAL CARE RECORD











Chris Tatmu MD May 14, 2018 15:23

## 2018-05-14 NOTE — MP
cc:

Chris Tatum MD

****

 

 

DATE OF OPERATION:

05/14/2018

 

PREOPERATIVE DIAGNOSIS:

Left lower extremity rest pain.  Failed endovascular intervention.

 

POSTOPERATIVE DIAGNOSIS:

Left lower extremity rest pain, failed endovascular intervention.

 

PROCEDURE PERFORMED:

1.  Left lower extremity angiogram.

2.  Left profunda endarterectomy and patch angioplasty.

3.  Left femoral to posterior tibial artery bypass with cryopreserved 

vein.

 

OPERATING SURGEON:

Chris Tatum MD

 

ANESTHESIA:

General.

 

INDICATIONS FOR PROCEDURE:

Ms. Pham is a 78-year-old lady who has a history of peripheral 

arterial occlusive disease and a right lower extremity bypass as well 

as an aortobifemoral bypass.  She has had multiple left lower 

extremity interventions and these have failed.  Now, she has rest 

pain.  There is no prior catheter-based imaging available for my 

review since the worsening of her rest pain.  She was taken to the 

operating room for angiogram and a distal bypass revascularization.

 

DESCRIPTION OF PROCEDURE:

Informed consent was obtained from the patient.  She was taken to the 

operating room and placed supine on the operating table.  An 

appropriate timeout was taken to ensure the patient's identity, the 

operative site and planned procedure.  The administration of 2 grams 

of Ancef was initiated prior to skin incision and will be discontinued

after a single preoperative dose.  Everyone in the room agreed with 

the timeout and we proceeded.  She was prepped from her nipples to his

toes, a vertical incision made in the patient's left groin and carried

down through the subcutaneous tissue with electrocautery.  The ABF 

limb was identified and encircled and dissected free.  The profunda 

and superficial femoral artery were similarly dissected free.  The 

profunda was noted to have a high grade calcific stenosis proximally. 

A 21-gauge micropuncture needle was used to access the graft.  This 

was exchanged using Seldinger's technique for a micropuncture sheath 

through which a left lower extremity angiogram was obtained.  The 

angiogram showed the patient had profunda-based collaterals which 

reconstituted the posterior tibial and the peroneal.  The posterior 

tibia was in continuity with the foot.  This was then selected as the 

bypass target.  The microcatheter was removed and the graftotomy was 

closed with 5-0 Prolene suture.

 

A separate incision was made on the medial aspect of the calf, carried

down through subcutaneous tissue with electrocautery.  The posterior 

tibial artery was dissected free for several centimeters.  A tunnel 

was then created between these two.  The patient was systemically 

heparinized and throughout the remainder of the case the ACT was kept 

greater than 250.  Proximal control of the ABF limb and distal control

of the profunda were obtained with profunda clamps and a longitudinal 

arteriotomy was made with an 11 blade, extended with [place] 

scissors.  The artery was endarterectomized and patch was applied and 

sewn on using running 5-0 Prolene suture.  At the completion it was 

flushed and noted hemostatic.  A longitudinal patchotomy was made with

an 11 blade, extended with [place]uneet scissors.

 

The cryopreserved vein was brought up onto the field; it had been 

prepared in the standard fashion and it was flushed.  It was sewn in a

reversed fashion.  It was cut and bevelled and sewn end-to-side to the

patch with running 5-0 Prolene suture.  The clamps were then released;

the vein was distended, marked for orientation and passed through the 

tunnel, taking caution not to twist it.  Proximal and distal control 

of the posterior tibial artery was obtained with profunda clamps and a

longitudinal arteriotomy was made with 11 blade, extended with 

Puneet scissors.  A generous patch was then made for a distal 

anastomosis with a running 6-0 Prolene suture.  At the completion, it 

was flushed and noted to be hemostatic.  The clamps were released.  

There was a nice Doppler signal in the foot.  The heparin was 

reversed.  The wounds were irrigated and made hemostatic, and closed 

with 2-0 Polysorb, 3-0 Polysorb and 4-0 Monocryl.  The sponge and 

needle counts were correct at the end of the case.  I was present, 

scrubbed, and performed the entire procedure.

 

 

__________________________________

MD BOZENA Herrera/ANA

D: 05/14/2018, 03:40 PM

T: 05/14/2018, 04:09 PM

Visit #: [id number]

Job #: [id number]

## 2018-05-15 VITALS
DIASTOLIC BLOOD PRESSURE: 65 MMHG | TEMPERATURE: 98.7 F | HEART RATE: 103 BPM | RESPIRATION RATE: 18 BRPM | SYSTOLIC BLOOD PRESSURE: 143 MMHG | OXYGEN SATURATION: 95 %

## 2018-05-15 VITALS
DIASTOLIC BLOOD PRESSURE: 67 MMHG | HEART RATE: 80 BPM | SYSTOLIC BLOOD PRESSURE: 167 MMHG | TEMPERATURE: 98.6 F | OXYGEN SATURATION: 98 % | RESPIRATION RATE: 18 BRPM

## 2018-05-15 VITALS
HEART RATE: 94 BPM | DIASTOLIC BLOOD PRESSURE: 78 MMHG | TEMPERATURE: 98.8 F | RESPIRATION RATE: 18 BRPM | SYSTOLIC BLOOD PRESSURE: 151 MMHG | OXYGEN SATURATION: 92 %

## 2018-05-15 VITALS
SYSTOLIC BLOOD PRESSURE: 150 MMHG | OXYGEN SATURATION: 95 % | TEMPERATURE: 98.6 F | HEART RATE: 103 BPM | RESPIRATION RATE: 19 BRPM | DIASTOLIC BLOOD PRESSURE: 65 MMHG

## 2018-05-15 VITALS — HEART RATE: 100 BPM

## 2018-05-15 VITALS — HEART RATE: 101 BPM

## 2018-05-15 VITALS — HEART RATE: 93 BPM

## 2018-05-15 VITALS — HEART RATE: 99 BPM

## 2018-05-15 VITALS — HEART RATE: 104 BPM

## 2018-05-15 VITALS — HEART RATE: 103 BPM

## 2018-05-15 VITALS — HEART RATE: 96 BPM

## 2018-05-15 LAB
BUN SERPL-MCNC: 11 MG/DL (ref 7–18)
CALCIUM SERPL-MCNC: 8.3 MG/DL (ref 8.5–10.1)
CHLORIDE SERPL-SCNC: 102 MEQ/L (ref 98–107)
CREAT SERPL-MCNC: 0.72 MG/DL (ref 0.5–1)
GFR SERPLBLD BASED ON 1.73 SQ M-ARVRAT: 79 ML/MIN (ref 89–?)
GLUCOSE SERPL-MCNC: 160 MG/DL (ref 74–106)
HCO3 BLD-SCNC: 28.8 MEQ/L (ref 21–32)
SODIUM SERPL-SCNC: 138 MEQ/L (ref 136–145)

## 2018-05-15 RX ADMIN — FAMOTIDINE SCH MG: 20 TABLET, FILM COATED ORAL at 08:31

## 2018-05-15 RX ADMIN — FAMOTIDINE SCH MG: 20 TABLET, FILM COATED ORAL at 21:51

## 2018-05-15 RX ADMIN — ASPIRIN 81 MG SCH MG: 81 TABLET ORAL at 08:34

## 2018-05-15 RX ADMIN — ATORVASTATIN CALCIUM SCH MG: 40 TABLET, FILM COATED ORAL at 21:50

## 2018-05-15 RX ADMIN — STANDARDIZED SENNA CONCENTRATE AND DOCUSATE SODIUM SCH TAB: 8.6; 5 TABLET, FILM COATED ORAL at 08:31

## 2018-05-15 RX ADMIN — GABAPENTIN SCH MG: 100 CAPSULE ORAL at 23:26

## 2018-05-15 RX ADMIN — ENOXAPARIN SODIUM SCH MG: 60 INJECTION SUBCUTANEOUS at 13:21

## 2018-05-15 RX ADMIN — STANDARDIZED SENNA CONCENTRATE AND DOCUSATE SODIUM SCH TAB: 8.6; 5 TABLET, FILM COATED ORAL at 21:51

## 2018-05-15 RX ADMIN — CLOPIDOGREL BISULFATE SCH MG: 75 TABLET, FILM COATED ORAL at 08:34

## 2018-05-15 NOTE — PD.VS.PN
Subjective


POD #:  1


Procedure(s):  


L PFA patch endarterectomy


L fem-PT with cryo


L LE angiogram


Subjective/Hospital Course


Pt alert this am w/o complaints


Pain controlled 


Pt reported improved L LE pain since surgical intervention


LE warm w/ motor intact


Pt with Palpable L LE distal pulses


Mild sanguinous drainage noted at the distal end of incision line L LE  (no S/R

) 


 (Allegra Lizarraga)





Objective


Vitals/I&O











  Date Time  Temp Pulse Resp B/P (MAP) Pulse Ox O2 Delivery O2 Flow Rate FiO2


 


5/15/18 08:00 98.6 80 18 152/64 (93) 98   





    167/67 (100)    


 


5/15/18 08:00     96 Nasal Cannula 2.00 


 


5/15/18 06:00  78 16 154/72 (99) 97 Nasal Cannula 2 





    154/58 (90)    


 


5/15/18 05:00  76 17 146/66 (92) 97 Nasal Cannula 2 





    136/57 (83)    


 


5/15/18 04:00 98.5 100 15 141/65 (90) 97 Nasal Cannula 2 





    125/53 (77)    


 


5/15/18 03:00  82 15 130/60 (83) 96 Nasal Cannula 2 





    120/56 (77)    


 


5/15/18 02:00  83 14 141/65 (90) 96 Nasal Cannula 2 





    131/56 (81)    


 


5/15/18 01:00  83 14 127/60 (82) 96 Nasal Cannula 2 





    114/54 (74)    


 


5/15/18 00:00 98.3 97 15 137/64 (88) 96 Nasal Cannula 2 





    140/61 (87)    


 


5/14/18 23:00  91 13 140/64 (89) 97 Nasal Cannula 2 





    137/60 (85)    


 


5/14/18 22:00 98.5 90 12 149/67 (94) 96 Nasal Cannula 2 





    135/66 (89)    


 


5/14/18 21:00  90 12 153/71 (98) 97 Nasal Cannula 2 





    137/57 (83)    


 


5/14/18 20:00  86 12 142/67 (92) 98 Nasal Cannula 2 





    140/60 (86)    


 


5/14/18 19:11  87 17 144/65 (91) 98 Room Air  


 


5/14/18 18:15  82 17 163/72 (102) 98 Room Air  


 


5/14/18 17:45  86 17 153/68 (96) 98 Nasal Cannula 2 


 


5/14/18 17:30  83 17 160/69 (99) 98 Nasal Cannula 3 


 


5/14/18 17:15  90 17 171/67 (101) 99 Nasal Cannula 3 


 


5/14/18 17:00  90 17 169/78 (108) 99 Nasal Cannula 3 


 


5/14/18 16:45  90 17 165/77 (106) 99 Nasal Cannula 3 


 


5/14/18 16:30  89 17 166/75 (105) 99 Nasal Cannula 3 


 


5/14/18 16:15  92 17 165/74 (104) 99 Nasal Cannula 3 


 


5/14/18 16:00  89 16 152/70 (97) 99 Nasal Cannula 3 


 


5/14/18 15:45  87 16 156/71 (99) 97 Nasal Cannula 3 


 


5/14/18 15:43 97.5 82 16 141/67 (91) 98 Nasal Cannula 3 














 5/15/18 5/15/18 5/15/18





 07:00 15:00 23:00


 


Intake Total 393 ml  


 


Output Total 325 ml  


 


Balance 68 ml  








Exam:


GENERAL: A&OX3,NAD


SKIN: 


LE warm w/ motor intact


Prevena wound vac to L groin in place w/o swelling or hematoma


Incision to L LE with staple closure, mild sanguinous drainage noted at the 

distal end of the incision line 


No Swelling/Pain/Odor present 


CARDIOVASCULAR: RRR, + S1,S2


RESPIRATORY: BS CTA/No accessory muscle use.


GASTROINTESTINAL: Abdomen soft, non-tender, nondistended. 


MUSCULOSKELETAL: No cyanosis, or edema. 








Pulses:


Palpable L PT 


Multiphasic L/R DP/PT heard via Doppler


LE warm


Laboratory





Laboratory Tests








Test


  5/14/18


16:13 5/14/18


22:20 5/15/18


05:53 5/15/18


07:30


 


White Blood Count 8.1    


 


Red Blood Count 4.01    


 


Hemoglobin 12.6    


 


Hematocrit 36.7    


 


Mean Corpuscular Volume 91.4    


 


Mean Corpuscular Hemoglobin 31.3    


 


Mean Corpuscular Hemoglobin


Concent 34.3 


  


  


  


 


 


Red Cell Distribution Width 14.0    


 


Platelet Count 179    


 


Mean Platelet Volume 7.4    


 


Prothrombin Time 10.7    


 


Prothromb Time International


Ratio 1.1 


  


  


  


 


 


Activated Partial


Thromboplast Time 45.7 


  41.8 


  54.1 


  


 


 


Blood Urea Nitrogen   11  


 


Creatinine   0.72  


 


Random Glucose   160  


 


Calcium Level   8.3  


 


Sodium Level   138  


 


Potassium Level   3.8  


 


Chloride Level   102  


 


Carbon Dioxide Level   28.8  


 


Anion Gap   7  


 


Estimat Glomerular Filtration


Rate 


  


  79 


  


 


 


Nasal Screen MRSA (PCR)


  


  


  


  MRSA NOT


DETECTED








 (Allegra Lizarraga)





Assessment and Plan


Assessment:  


(1) PAD (peripheral artery disease)


(2) Claudication in peripheral vascular disease


Plan


78/F S/P successful L LE revascularization 


POD 1 pt doing well


Pt w/o complaints


Pain controlled


Pt reported improved L LE discomfort 





Plan


D/C staples/A line


PT- OOB


D/C Heparin Drip - transition to Lovenox 





Allegra Lizarraga NP


[facility]


[phone]


Discharge Planning


likely 2-3 days


 (Allegra Lizarraga)


Plan


POD#1 s/p L groin reconstruction and distal bypass


palpable PT


OOB TC, Staples out, a-line out, normalize with anticoagulation


 (Chris Tatum MD)











Allegra Lizarraga May 15, 2018 11:38


Chris Tatum MD May 15, 2018 13:32

## 2018-05-16 VITALS
OXYGEN SATURATION: 94 % | DIASTOLIC BLOOD PRESSURE: 75 MMHG | HEART RATE: 93 BPM | SYSTOLIC BLOOD PRESSURE: 170 MMHG | RESPIRATION RATE: 20 BRPM | TEMPERATURE: 98.4 F

## 2018-05-16 VITALS
DIASTOLIC BLOOD PRESSURE: 83 MMHG | HEART RATE: 89 BPM | TEMPERATURE: 98.2 F | RESPIRATION RATE: 18 BRPM | OXYGEN SATURATION: 95 % | SYSTOLIC BLOOD PRESSURE: 189 MMHG

## 2018-05-16 VITALS
DIASTOLIC BLOOD PRESSURE: 71 MMHG | OXYGEN SATURATION: 97 % | RESPIRATION RATE: 18 BRPM | HEART RATE: 73 BPM | SYSTOLIC BLOOD PRESSURE: 160 MMHG | TEMPERATURE: 98.6 F

## 2018-05-16 VITALS — HEART RATE: 94 BPM

## 2018-05-16 VITALS
TEMPERATURE: 98.7 F | DIASTOLIC BLOOD PRESSURE: 72 MMHG | RESPIRATION RATE: 19 BRPM | HEART RATE: 90 BPM | OXYGEN SATURATION: 95 % | SYSTOLIC BLOOD PRESSURE: 166 MMHG

## 2018-05-16 VITALS — HEART RATE: 83 BPM

## 2018-05-16 VITALS
SYSTOLIC BLOOD PRESSURE: 170 MMHG | DIASTOLIC BLOOD PRESSURE: 70 MMHG | OXYGEN SATURATION: 94 % | HEART RATE: 93 BPM | RESPIRATION RATE: 18 BRPM | TEMPERATURE: 98.7 F

## 2018-05-16 VITALS — HEART RATE: 87 BPM

## 2018-05-16 VITALS — HEART RATE: 86 BPM

## 2018-05-16 VITALS — HEART RATE: 90 BPM

## 2018-05-16 VITALS — HEART RATE: 91 BPM

## 2018-05-16 VITALS
OXYGEN SATURATION: 94 % | TEMPERATURE: 98.8 F | SYSTOLIC BLOOD PRESSURE: 162 MMHG | DIASTOLIC BLOOD PRESSURE: 73 MMHG | RESPIRATION RATE: 18 BRPM | HEART RATE: 100 BPM

## 2018-05-16 VITALS
DIASTOLIC BLOOD PRESSURE: 76 MMHG | SYSTOLIC BLOOD PRESSURE: 167 MMHG | OXYGEN SATURATION: 92 % | HEART RATE: 87 BPM | RESPIRATION RATE: 18 BRPM | TEMPERATURE: 98.6 F

## 2018-05-16 VITALS — HEART RATE: 96 BPM

## 2018-05-16 VITALS — HEART RATE: 82 BPM

## 2018-05-16 VITALS — HEART RATE: 105 BPM

## 2018-05-16 VITALS — HEART RATE: 78 BPM

## 2018-05-16 VITALS — HEART RATE: 77 BPM

## 2018-05-16 VITALS — HEART RATE: 101 BPM

## 2018-05-16 VITALS — HEART RATE: 84 BPM

## 2018-05-16 VITALS — HEART RATE: 100 BPM

## 2018-05-16 RX ADMIN — CLOPIDOGREL BISULFATE SCH MG: 75 TABLET, FILM COATED ORAL at 08:16

## 2018-05-16 RX ADMIN — GABAPENTIN SCH MG: 100 CAPSULE ORAL at 20:49

## 2018-05-16 RX ADMIN — STANDARDIZED SENNA CONCENTRATE AND DOCUSATE SODIUM SCH TAB: 8.6; 5 TABLET, FILM COATED ORAL at 08:16

## 2018-05-16 RX ADMIN — ENOXAPARIN SODIUM SCH MG: 60 INJECTION SUBCUTANEOUS at 02:27

## 2018-05-16 RX ADMIN — FAMOTIDINE SCH MG: 20 TABLET, FILM COATED ORAL at 08:16

## 2018-05-16 RX ADMIN — ATORVASTATIN CALCIUM SCH MG: 40 TABLET, FILM COATED ORAL at 20:49

## 2018-05-16 RX ADMIN — FAMOTIDINE SCH MG: 20 TABLET, FILM COATED ORAL at 20:49

## 2018-05-16 RX ADMIN — ASPIRIN 81 MG SCH MG: 81 TABLET ORAL at 08:16

## 2018-05-16 RX ADMIN — STANDARDIZED SENNA CONCENTRATE AND DOCUSATE SODIUM SCH TAB: 8.6; 5 TABLET, FILM COATED ORAL at 20:48

## 2018-05-16 NOTE — PD.VS.PN
Subjective


POD #:  2


Procedure(s):  


L PFA patch endarterectomy


L fem-PT with cryo


L LE angiogram


Subjective/Hospital Course


looks great


voided this morning


pain controlled


wendy po





Objective


Vitals/I&O











  Date Time  Temp Pulse Resp B/P (MAP) Pulse Ox O2 Delivery O2 Flow Rate FiO2


 


5/16/18 06:00  77      


 


5/16/18 05:00  101      


 


5/16/18 04:00  94      


 


5/16/18 03:14 98.6 87 18 167/76 (106) 92   


 


5/16/18 03:00  77      


 


5/16/18 02:00  96      


 


5/16/18 01:00  86      


 


5/16/18 00:12 98.8 100 18 162/73 (102) 94   


 


5/16/18 00:00  100      


 


5/15/18 23:00  93      


 


5/15/18 22:00  100      


 


5/15/18 21:20      Nasal Cannula 2.00 


 


5/15/18 21:00  96      


 


5/15/18 20:30 98.8 94 18 151/78 (102) 92   


 


5/15/18 20:00  104      


 


5/15/18 20:00     95 Room Air  


 


5/15/18 19:00  103      


 


5/15/18 18:00 98.7 103 18 143/65 (91) 95   





    Arterial Line    


 


5/15/18 16:00  99      


 


5/15/18 16:00     95 Room Air  


 


5/15/18 12:00 98.6 103 19 143/56 (85) 95   





    150/65 (93)    


 


5/15/18 10:00  101      


 


5/15/18 08:00 98.6 80 18 152/64 (93) 98   





    167/67 (100)    


 


5/15/18 08:00  80      


 


5/15/18 08:00     96 Nasal Cannula 2.00 














 5/16/18 5/16/18 5/16/18





 07:00 15:00 23:00


 


Intake Total 240 ml  


 


Output Total 0 ml  


 


Balance 240 ml  








Exam:


L groin with prevena in place, groin soft


L calf with skin edge appearing bleeding, no hematoma


palpable PT


Laboratory





Laboratory Tests








Test


  5/16/18


04:30


 


Activated Partial


Thromboplast Time 26.2 


 











Assessment and Plan


Assessment:  


(1) PAD (peripheral artery disease)


(2) Claudication in peripheral vascular disease


Plan


POD#2 s/p L groin reconstruction and distal bypass


palpable PT





will d/c therapeutic lovenox and start prophylactic lovenox; 


needs ASA/plavix only


Discharge Planning


tomorrow (POD#3)











Chris Tatum MD May 16, 2018 07:53

## 2018-05-17 VITALS
HEART RATE: 89 BPM | RESPIRATION RATE: 20 BRPM | DIASTOLIC BLOOD PRESSURE: 70 MMHG | TEMPERATURE: 98.2 F | OXYGEN SATURATION: 95 % | SYSTOLIC BLOOD PRESSURE: 165 MMHG

## 2018-05-17 VITALS
HEART RATE: 80 BPM | RESPIRATION RATE: 18 BRPM | OXYGEN SATURATION: 96 % | DIASTOLIC BLOOD PRESSURE: 67 MMHG | SYSTOLIC BLOOD PRESSURE: 153 MMHG | TEMPERATURE: 98.4 F

## 2018-05-17 VITALS — HEART RATE: 80 BPM

## 2018-05-17 VITALS — HEART RATE: 74 BPM

## 2018-05-17 VITALS — HEART RATE: 88 BPM

## 2018-05-17 VITALS — HEART RATE: 90 BPM

## 2018-05-17 VITALS
OXYGEN SATURATION: 98 % | RESPIRATION RATE: 20 BRPM | HEART RATE: 78 BPM | DIASTOLIC BLOOD PRESSURE: 70 MMHG | SYSTOLIC BLOOD PRESSURE: 178 MMHG | TEMPERATURE: 98 F

## 2018-05-17 VITALS — HEART RATE: 87 BPM

## 2018-05-17 VITALS — HEART RATE: 79 BPM

## 2018-05-17 VITALS — HEART RATE: 69 BPM

## 2018-05-17 VITALS — HEART RATE: 75 BPM

## 2018-05-17 RX ADMIN — CLOPIDOGREL BISULFATE SCH MG: 75 TABLET, FILM COATED ORAL at 08:33

## 2018-05-17 RX ADMIN — STANDARDIZED SENNA CONCENTRATE AND DOCUSATE SODIUM SCH TAB: 8.6; 5 TABLET, FILM COATED ORAL at 08:33

## 2018-05-17 RX ADMIN — ASPIRIN 81 MG SCH MG: 81 TABLET ORAL at 08:32

## 2018-05-17 RX ADMIN — FAMOTIDINE SCH MG: 20 TABLET, FILM COATED ORAL at 08:32

## 2018-05-17 NOTE — HHI.FF
Face to Face Verification


Diagnosis:  


(1) Claudication in peripheral vascular disease


(2) PAD (peripheral artery disease)


Physical Therapy


Order:  Evaluate and Treat, Improve ambulation, Strength and gait training





Home Health Nursing








Order: Medical education





 Signs/symptoms of disease process





 Wound care and dressing changes








Instructions:


Pt s/p L LE revascularization (distal bypass)


Pt w/ mild serious drainage to distal end of LLE incision





May cleanse with sterile NS


Apply ABD pad then kerlix tape


Change dressing BID or as needed if soiled











I have seen patient Armani Pham on 5/17/18. My clinical findings support 

the need for the requested home health care services because: Pt will need out 

pt assistance for optimal healing and wound surveillance








 Deconditioned w/ increased weakness





 Limited ability to care for self














I certify that my clinical findings support that this patient is homebound 

because: Pt will need out pt assistance for optimal healing and wound 

surveillance








 Post-op weakness





 Unsteady gait/balance

















Allegra Lizarraga May 17, 2018 10:31

## 2018-05-17 NOTE — PD.VS.PN
Subjective


POD #:  3


Procedure(s):  


L PFA patch endarterectomy


L fem-PT with cryo


L LE angiogram


Subjective/Hospital Course


78/F S/P L LE revascularization 


Pt w/o complaints of pain 


Pt looks great


LE warm w/ motor intact





Objective


Vitals/I&O











  Date Time  Temp Pulse Resp B/P (MAP) Pulse Ox O2 Delivery O2 Flow Rate FiO2


 


5/17/18 10:00  90      


 


5/17/18 09:00  90      


 


5/17/18 08:00  87      


 


5/17/18 07:00     95 Room Air  


 


5/17/18 07:00  89      


 


5/17/18 07:00 98.2 89 20 165/70 (101) 95   


 


5/17/18 06:05  88      


 


5/17/18 05:27  80      


 


5/17/18 04:35  79      


 


5/17/18 03:44 98.4 80 18 153/67 (95) 96   


 


5/17/18 03:44      Room Air  


 


5/17/18 03:44  81      


 


5/17/18 02:08  69      


 


5/17/18 01:51  75      


 


5/17/18 00:15  74      


 


5/16/18 23:40  91      


 


5/16/18 23:15 98.7 90 19 166/72 (103) 95   


 


5/16/18 23:15      Room Air  


 


5/16/18 22:00  78      


 


5/16/18 21:00  96      


 


5/16/18 20:00  78      


 


5/16/18 19:30 98.4 93 20 170/75 (106) 94   


 


5/16/18 19:30      Room Air  


 


5/16/18 19:30  91      


 


5/16/18 18:08  86      


 


5/16/18 17:55  82      


 


5/16/18 16:03  87      


 


5/16/18 15:35   16     


 


5/16/18 15:04 98.2 89 18 189/83 (118) 95   


 


5/16/18 15:04     95 Room Air  


 


5/16/18 15:04  80      


 


5/16/18 14:25  105      


 


5/16/18 13:05  84      


 


5/16/18 12:01  87      


 


5/16/18 11:05 98.6 73 18 160/71 (100) 97   


 


5/16/18 11:05  77      


 


5/16/18 11:05     97 Room Air  














 5/17/18 5/17/18 5/17/18





 07:00 15:00 23:00


 


Intake Total 240 ml  


 


Output Total 450 ml  


 


Balance -210 ml  








Exam:


GENERAL: A&OX3,NAD,GCS15


SKIN: 


LE Warm and dry motor intact 


Prevena wound vac to L groin w/o swelling or hematoma 


Incision to L LE intact with staples mind serious drainage present 


CARDIOVASCULAR: Regular rate and rhythm without murmurs, gallops, or rubs. 


RESPIRATORY: Breath sounds equal bilaterally. No accessory muscle use.


GASTROINTESTINAL: Abdomen soft, non-tender, nondistended. 


MUSCULOSKELETAL: No cyanosis, or edema.


Pulses:


Palpable L PT





Assessment and Plan


Assessment:  


(1) PAD (peripheral artery disease)


(2) Claudication in peripheral vascular disease


Plan


POD#3 s/p L groin reconstruction and distal bypass


palpable PT





Plan


Pt clear for D/C w/ HHS and PT


Arranged out pt f/u 


Keep Prevena wound vac in place, will remove Monday in our out pt clinic 





Allgera Lizarraga NP


[facility]


[phone]


Discharge Planning


today (POD#3)











Allegra Lizarraga May 17, 2018 10:24

## 2018-05-17 NOTE — PD.VS.DC
__________________________________________________





Discharge Summary


Admission Date:


May 14, 2018 at 08:20


Discharge Date:  May 17, 2018


Admission Diagnosis:  


(1) Claudication in peripheral vascular disease


(2) PAD (peripheral artery disease)


Discharge Diagnosis:  


(1) PAD (peripheral artery disease)


ICD Codes:  I73.9 - Peripheral vascular disease, unspecified


(2) Claudication in peripheral vascular disease


ICD Codes:  I73.9 - Peripheral vascular disease, unspecified


Brief History from admission


77 yo female with PAD and L LE rest pain, ABIs 0.4.  No tissue loss and no 

motor dysfunction.


Procedure(s):  


L PFA patch endarterectomy


L fem-PT with cryo


L LE angiogram


Significant Findings


GENERAL: A&OX3,NAD,GCS15


SKIN: 


LE Warm and dry motor intact 


Prevena wound vac to L groin w/o swelling or hematoma 


Incision to L LE intact with staples mind serious drainage present 


CARDIOVASCULAR: Regular rate and rhythm without murmurs, gallops, or rubs. 


RESPIRATORY: Breath sounds equal bilaterally. No accessory muscle use.


GASTROINTESTINAL: Abdomen soft, non-tender, nondistended. 


MUSCULOSKELETAL: No cyanosis, or edema.


Pulses:


Palpable L PT





Laboratory Tests








Test


  5/14/18


16:13 5/14/18


22:20 5/15/18


05:53 5/15/18


07:30


 


Activated Partial


Thromboplast Time 45.7 SEC


(24.3-30.1) 41.8 SEC


(24.3-30.1) 54.1 SEC


(24.3-30.1) 


 


 


Random Glucose


  


  


  160 MG/DL


() 


 


 


Calcium Level


  


  


  8.3 MG/DL


(8.5-10.1) 


 


 


Estimat Glomerular Filtration


Rate 


  


  79 ML/MIN


(>89) 


 


 


Test


  5/16/18


04:30 


  


  


 








Hospital Course:


77 yo female with PAD and L LE rest pain


Previous ABIs 0.4.  


No tissue loss and no motor dysfunction


Pt S/p L groin reconstruction and distal bypass


POD 1


Pt alert this am w/o complaints


Pain controlled 


Pt reported improved L LE pain since surgical intervention


LE warm w/ motor intact


Pt with Palpable L LE distal pulses


Mild sanguinous drainage noted at the distal end of incision line L LE  (no S/R

) 


POD 2


looks great


voided this morning


pain controlled


wendy po


POD 3


Pt w/o complaints of pain 


Pt looks great


LE warm w/ motor intact


Distal palpable pulses


Pt clear for d/c with HHS and PT


Arranged out pt f/u on Monday for vac removal 





Allergies








Coded Allergies Type Severity Reaction Last Updated Verified


 


  ACE Inhibitors Adverse Reaction Severe Cough 5/10/18 Yes














 5/15/18 5/15/18 5/16/18 5/16/18 5/17/18 5/17/18





 06:00 18:00 06:00 18:00 06:00 18:00


 


Intake Total 463 ml 292 ml 30 ml 720 ml 240 ml 


 


Output Total 580 ml 150 ml  450 ml 450 ml 


 


Balance -117 ml 142 ml 30 ml 270 ml -210 ml 


 


      


 


Intake Oral 320 ml 240 ml 30 ml 720 ml 240 ml 


 


IV Total 143 ml 52 ml    


 


Output Urine Total 580 ml 150 ml  450 ml 450 ml 


 


Stool Total 0 ml     


 


# Bowel Movements  0  0  








Laboratory Tests








Test


  5/14/18


16:13 5/14/18


22:20 5/15/18


05:53 5/15/18


07:30


 


White Blood Count 8.1 TH/MM3    


 


Red Blood Count 4.01 MIL/MM3    


 


Hemoglobin 12.6 GM/DL    


 


Hematocrit 36.7 %    


 


Mean Corpuscular Volume 91.4 FL    


 


Mean Corpuscular Hemoglobin 31.3 PG    


 


Mean Corpuscular Hemoglobin


Concent 34.3 % 


  


  


  


 


 


Red Cell Distribution Width 14.0 %    


 


Platelet Count 179 TH/MM3    


 


Mean Platelet Volume 7.4 FL    


 


Prothrombin Time 10.7 SEC    


 


Prothromb Time International


Ratio 1.1 RATIO 


  


  


  


 


 


Activated Partial


Thromboplast Time 45.7 SEC 


  41.8 SEC 


  54.1 SEC 


  


 


 


Blood Urea Nitrogen   11 MG/DL  


 


Creatinine   0.72 MG/DL  


 


Random Glucose   160 MG/DL  


 


Calcium Level   8.3 MG/DL  


 


Sodium Level   138 MEQ/L  


 


Potassium Level   3.8 MEQ/L  


 


Chloride Level   102 MEQ/L  


 


Carbon Dioxide Level   28.8 MEQ/L  


 


Anion Gap   7 MEQ/L  


 


Estimat Glomerular Filtration


Rate 


  


  79 ML/MIN 


  


 


 


Nasal Screen MRSA (PCR)


  


  


  


  MRSA NOT


DETECTED


 


Test


  5/16/18


04:30 


  


  


 


 


Activated Partial


Thromboplast Time 26.2 SEC 


  


  


  


 








Orders








Procedure Category Date Status





  Time 


 


Iohexol 300 Inj MED 5/14/18 Complete





 (Omnipaque 300 Inj)  10:45 


 


Acetaminophen 1000 MED 5/14/18 Complete





Mg/100 Ml (Ofirmev 10  11:28 


 


Nitroglycerin Inj MED 5/14/18 Complete





 (Nitroglycerin Inj)  11:30 


 


Famotidine Inj MED 5/14/18 Complete





 (Pepcid Inj)  11:30 


 


Protamine Sulfate Inj MED 5/14/18 Complete





 (Protamine Sulfate  11:33 


 


Heparin Inj (Heparin MED 5/14/18 Complete





Inj)  11:33 


 


Bupivacaine Pf 0.5% MED 5/14/18 Complete





Inj (Marcaine Pf 0.5  11:33 


 


Thrombin Top Spray MED 5/14/18 Complete





 (Thrombin Top Spray)  11:33 


 


Heparin-Ns/Pf Inj MED 5/14/18 Complete





 (Heparin-Ns/Pf Inj)  11:33 


 


Cefazolin 2 Gm Premix MED 5/14/18 Complete





 (Ancef 2 Gm Premix  11:33 


 


Endovascular Cath CATH 5/14/18 Logged





   


 


Urinary Catheter BRONWYN 5/14/18 Complete





Management  12:00 


 


Bupivacaine Pf 0.5% MED 5/14/18 Complete





Inj (Marcaine Pf 0.5  14:02 


 


Remove Urinary BRONWYN 5/15/18 In Process





Catheter  08:00 


 


Heparin-D5w 25,000 MED 5/14/18 Complete





U/250 Ml (Heparin-D5w  15:30 


 


Act Partial Throm LAB 5/14/18 Complete





Time (Ptt)  15:23 


 


Prothrombin Time / LAB 5/14/18 Complete





Inr (Pt)  15:23 


 


Cbc No Diff, Includes LAB 5/14/18 Complete





Plts  15:23 


 


Act Partial Throm LAB 5/14/18 Complete





Time (Ptt)  22:23 


 


Specimen To Be BRONWYN 5/14/18 In Process





Collected  15:23 


 


Admit To Inpatient ADMITTING 5/14/18 Transmitted





   


 


Code Status CODE 5/14/18 Transmitted





  15:23 


 


Vital Signs (Adult) BRONWYN 5/14/18 In Process





  15:23 


 


Cardiac Monitor / BRONWYN 5/14/18 In Process





Telemetry  15:23 


 


Activity Oob Ad Nimo BRONWYN 5/15/18 In Process





  08:00 


 


Activity Bed Rest BRONWYN 5/14/18 In Process





  15:23 


 


Precautions BRONWYN 5/14/18 In Process





  15:23 


 


^ Vac Dressing To Be BRONWYN 5/14/18 In Process





Used  15:23 


 


Diet Heart Healthy DIET 5/14/18 Transmitted





  Dinner 


 


Basic Metabolic Panel LAB 5/15/18 Complete





 (Bmp)  06:00 


 


Consult Pt Eval & PT 5/14/18 Logged





Treat  15:23 


 


Aspirin Chew (Aspirin MED 5/15/18 In Process





Chew)  09:00 


 


Famotidine (Pepcid) MED 5/14/18 In Process





  21:00 


 


Atorvastatin (Lipitor) MED 5/14/18 In Process





  21:00 


 


Oxycodone (Roxicodone) MED 5/14/18 In Process





  15:30 


 


Hydromorphone MED 5/14/18 In Process





 (Dilaudid)  15:30 


 


Morphine Inj MED 5/14/18 In Process





 (Morphine Inj)  15:30 


 


Docusate Sodium-Senna MED 5/14/18 In Process





 (Nicole-Colace)  21:00 


 


Magnesium Hydroxide MED 5/14/18 In Process





Liq (Milk Of Magnesi  15:30 


 


Sennosides (Senokot) MED 5/14/18 In Process





  15:30 


 


Bisacodyl Supp MED 5/14/18 In Process





 (Dulcolax Supp)  15:30 


 


Lactulose Liq MED 5/14/18 In Process





 (Lactulose Liq)  15:30 


 


Inpatient ADMITTING 5/14/18 Transmitted





Certification   


 


Clopidogrel (Plavix) MED 5/15/18 In Process





  09:00 


 


Gabapentin (Neurontin) MED 5/14/18 In Process





  21:00 


 


Hydralazine Inj MED 5/14/18 In Process





 (Apresoline Inj)  15:30 


 


Am Admit Pre Op Care [facility] 5/14/18 Complete





   


 


Fentanyl Inj MED 5/14/18 Complete





 (Fentanyl Inj)  15:51 


 


Midazolam Inj (Versed MED 5/14/18 Complete





Inj)  15:51 


 


Nursing Information MED 5/14/18 Complete





 (Misc Nursing Inform  15:45 


 


*Morphine Inj MED 5/14/18 Complete





 (*Morphine Inj  17:07 


 


*Morphine Inj MED 5/14/18 Complete





 (*Morphine Inj  18:06 


 


Act Partial Throm LAB 5/15/18 Complete





Time (Ptt)  06:00 


 


^ Other Nursing Orders BRONWYN 5/15/18 In Process





  06:46 


 


^ Pulse Checks BRONWYN 5/15/18 In Process





  06:46 


 


Act Partial Throm LAB 5/16/18 Complete





Time (Ptt)  07:25 


 


Mrsa Pcr Surveillance LAB 5/15/18 Complete





  07:35 


 


[facility]





MIN   


 


[facility]





   


 


[facility] Complete





   


 


Pacu Isc Holding [facility] 5/14/18 Complete





Hourly   


 


Pacu Isc Holding [facility] 5/15/18 Complete





Hourly   


 


Family Notification BRONWYN 5/15/18 In Process





  07:48 


 


 (Hub Use Only)Inp Phy CONS 5/15/18 Transmitted





Cons/Ref   


 


Remove Urinary BRONWYN 5/15/18 In Process





Catheter  11:21 


 


^ Discontinue BRONWYN 5/15/18 In Process





Arterial Line  11:21 


 


Consult Pt Eval & Tx PT 5/15/18 Complete





OOB  11:21 


 


Enoxaparin Inj MED 5/15/18 Complete





 (Lovenox Inj)  12:00 


 


Activity Oob With BRONWYN 5/15/18 In Process





Assistance  13:29 


 


Ondansetron Inj MED 5/15/18 Complete





 (Zofran Inj)  13:45 


 


Patient Transfer ADMITTING 5/15/18 Transmitted





   


 


Enoxaparin Inj MED 5/16/18 In Process





 (Lovenox Inj)  14:00 


 


Sodium Chlorid 0.9% MED 5/14/18 Complete





500 Ml Inj (Ns 500 M  12:00 


 


Normosol R Inj MED 5/14/18 Complete





 (Normosol R Inj)  12:00 


 


Lidocaine Pf 1% Inj MED 5/14/18 Complete





 (Xylocaine-Mpf 1% In  12:00 


 


Rocuronium Inj MED 5/14/18 Complete





 (Zemuron Inj)  12:00 


 


Ephedrine/Ns 25 Mg/5 MED 5/14/18 Complete





Ml Syr (Ephedrine/N  12:00 


 


Metoprolol Tartrate MED 5/14/18 Complete





Inj (Lopressor Inj)  12:00 


 


Hydralazine Inj MED 5/14/18 Complete





 (Apresoline Inj)  12:00 


 


Esmolol Bolus Inj MED 5/14/18 Complete





 (Brevibloc Bolus Inj)  12:00 


 


Dexamethasone Inj MED 5/14/18 Complete





 (Decadron Inj)  12:00 


 


Ondansetron Inj MED 5/14/18 Complete





 (Zofran Inj)  12:00 


 


Propofol 200 Mg/20 Ml MED 5/14/18 Complete





Inj (Diprivan  200  12:00 


 


Attending Discharge DISCHARGE 5/17/18 Transmitted





Order   








Vital Signs








  Date Time  Temp Pulse Resp B/P (MAP) Pulse Ox O2 Delivery O2 Flow Rate FiO2


 


5/17/18 10:00  90      


 


5/17/18 09:00  90      


 


5/17/18 08:00  87      


 


5/17/18 07:00     95 Room Air  


 


5/17/18 07:00  89      


 


5/17/18 07:00 98.2 89 20 165/70 (101) 95   


 


5/17/18 06:05  88      


 


5/17/18 05:27  80      


 


5/17/18 04:35  79      


 


5/17/18 03:44 98.4 80 18 153/67 (95) 96   


 


5/17/18 03:44      Room Air  


 


5/17/18 03:44  81      


 


5/17/18 02:08  69      


 


5/17/18 01:51  75      


 


5/17/18 00:15  74      


 


5/16/18 23:40  91      


 


5/16/18 23:15 98.7 90 19 166/72 (103) 95   


 


5/16/18 23:15      Room Air  


 


5/16/18 22:00  78      


 


5/16/18 21:00  96      


 


5/16/18 20:00  78      


 


5/16/18 19:30 98.4 93 20 170/75 (106) 94   


 


5/16/18 19:30      Room Air  


 


5/16/18 19:30  91      


 


5/16/18 18:08  86      


 


5/16/18 17:55  82      


 


5/16/18 16:03  87      


 


5/16/18 15:35   16     


 


5/16/18 15:04 98.2 89 18 189/83 (118) 95   


 


5/16/18 15:04     95 Room Air  


 


5/16/18 15:04  80      


 


5/16/18 14:25  105      


 


5/16/18 13:05  84      


 


5/16/18 12:01  87      


 


5/16/18 11:05 98.6 73 18 160/71 (100) 97   


 


5/16/18 11:05  77      


 


5/16/18 11:05     97 Room Air  


 


5/16/18 10:05  83      


 


5/16/18 09:18  90      


 


5/16/18 08:04  90      


 


5/16/18 07:57 98.7 94 18 170/70 (103) 94   


 


5/16/18 07:57     94 Room Air  


 


5/16/18 07:57  93      


 


5/16/18 06:00  77      


 


5/16/18 05:00  101      


 


5/16/18 04:00  94      


 


5/16/18 03:14 98.6 87 18 167/76 (106) 92   


 


5/16/18 03:00  77      


 


5/16/18 02:00  96      


 


5/16/18 01:00  86      


 


5/16/18 00:12 98.8 100 18 162/73 (102) 94   


 


5/16/18 00:00  100      


 


5/15/18 23:00  93      


 


5/15/18 22:00  100      


 


5/15/18 21:20      Nasal Cannula 2.00 


 


5/15/18 21:00  96      


 


5/15/18 20:30 98.8 94 18 151/78 (102) 92   


 


5/15/18 20:00  104      


 


5/15/18 20:00     95 Room Air  


 


5/15/18 19:00  103      


 


5/15/18 18:00 98.7 103 18 143/65 (91) 95   





    Arterial Line    


 


5/15/18 16:00  99      


 


5/15/18 16:00     95 Room Air  


 


5/15/18 12:00 98.6 103 19 143/56 (85) 95   





    150/65 (93)    


 


5/15/18 10:00  101      


 


5/15/18 08:00 98.6 80 18 152/64 (93) 98   





    167/67 (100)    


 


5/15/18 08:00  80      


 


5/15/18 08:00     96 Nasal Cannula 2.00 


 


5/15/18 06:00  78 16 154/72 (99) 97 Nasal Cannula 2 





    154/58 (90)    


 


5/15/18 05:00  76 17 146/66 (92) 97 Nasal Cannula 2 





    136/57 (83)    


 


5/15/18 04:00 98.5 100 15 141/65 (90) 97 Nasal Cannula 2 





    125/53 (77)    


 


5/15/18 03:00  82 15 130/60 (83) 96 Nasal Cannula 2 





    120/56 (77)    


 


5/15/18 02:00  83 14 141/65 (90) 96 Nasal Cannula 2 





    131/56 (81)    


 


5/15/18 01:00  83 14 127/60 (82) 96 Nasal Cannula 2 





    114/54 (74)    


 


5/15/18 00:00 98.3 97 15 137/64 (88) 96 Nasal Cannula 2 





    140/61 (87)    


 


5/14/18 23:00  91 13 140/64 (89) 97 Nasal Cannula 2 





    137/60 (85)    


 


5/14/18 22:00 98.5 90 12 149/67 (94) 96 Nasal Cannula 2 





    135/66 (89)    


 


5/14/18 21:00  90 12 153/71 (98) 97 Nasal Cannula 2 





    137/57 (83)    


 


5/14/18 20:00  86 12 142/67 (92) 98 Nasal Cannula 2 





    140/60 (86)    


 


5/14/18 19:11  87 17 144/65 (91) 98 Room Air  


 


5/14/18 18:15  82 17 163/72 (102) 98 Room Air  


 


5/14/18 17:45  86 17 153/68 (96) 98 Nasal Cannula 2 


 


5/14/18 17:30  83 17 160/69 (99) 98 Nasal Cannula 3 


 


5/14/18 17:15  90 17 171/67 (101) 99 Nasal Cannula 3 


 


5/14/18 17:00  90 17 169/78 (108) 99 Nasal Cannula 3 


 


5/14/18 16:45  90 17 165/77 (106) 99 Nasal Cannula 3 


 


5/14/18 16:30  89 17 166/75 (105) 99 Nasal Cannula 3 


 


5/14/18 16:15  92 17 165/74 (104) 99 Nasal Cannula 3 


 


5/14/18 16:00  89 16 152/70 (97) 99 Nasal Cannula 3 


 


5/14/18 15:45  87 16 156/71 (99) 97 Nasal Cannula 3 


 


5/14/18 15:43 97.5 82 16 141/67 (91) 98 Nasal Cannula 3 








Discharge Condition:  Good


Discharge Disposition:  Disch w/ Home Health Serv


Discharge Instructions:


DIET


You may resume your regular heart healthy diet


ACTIVITY


Activity as tolerated 


NO tub baths or swimming until incision is fully healed


Sponge baths are recommended until your prevena wound vac is removed


You may shower on Monday after your wound vac is removed 


WOUND CARE


Apply an ABD pad then Kerlix then tape to distal end of your L LE incision 

while drainage is present 


Change twice daily or as needed if soiled 


Call to report any increase in pain, swelling or drainage 


Leave your prevena wound vac in place until your office appointment on MONDAY- 

Will remove in the office 


MEDICATIONS


You may resume your daily home medications


You were prescribed a narcotic pain medication which may cause constipation- 

Take with an over the counter stool softener


You were prescribed a narcotic pain medication which may cause drowsiness- Do 

not drive while taking this medication


Any questions or concerns: Call [facility] at [facility]  [phone]











Allegra Lizarraga May 17, 2018 10:42